# Patient Record
Sex: MALE | Race: ASIAN | NOT HISPANIC OR LATINO
[De-identification: names, ages, dates, MRNs, and addresses within clinical notes are randomized per-mention and may not be internally consistent; named-entity substitution may affect disease eponyms.]

---

## 2017-10-07 ENCOUNTER — APPOINTMENT (OUTPATIENT)
Dept: MRI IMAGING | Facility: IMAGING CENTER | Age: 62
End: 2017-10-07
Payer: COMMERCIAL

## 2017-10-07 ENCOUNTER — OUTPATIENT (OUTPATIENT)
Dept: OUTPATIENT SERVICES | Facility: HOSPITAL | Age: 62
LOS: 1 days | End: 2017-10-07
Payer: COMMERCIAL

## 2017-10-07 DIAGNOSIS — Z00.8 ENCOUNTER FOR OTHER GENERAL EXAMINATION: ICD-10-CM

## 2017-10-07 PROCEDURE — A9585: CPT

## 2017-10-07 PROCEDURE — 70553 MRI BRAIN STEM W/O & W/DYE: CPT | Mod: 26

## 2017-10-07 PROCEDURE — 82565 ASSAY OF CREATININE: CPT

## 2017-10-07 PROCEDURE — 70553 MRI BRAIN STEM W/O & W/DYE: CPT

## 2017-12-19 ENCOUNTER — APPOINTMENT (OUTPATIENT)
Dept: MRI IMAGING | Facility: HOSPITAL | Age: 62
End: 2017-12-19

## 2017-12-19 ENCOUNTER — OUTPATIENT (OUTPATIENT)
Dept: OUTPATIENT SERVICES | Facility: HOSPITAL | Age: 62
LOS: 1 days | End: 2017-12-19
Payer: COMMERCIAL

## 2017-12-19 DIAGNOSIS — D49.6 NEOPLASM OF UNSPECIFIED BEHAVIOR OF BRAIN: ICD-10-CM

## 2017-12-19 DIAGNOSIS — I67.1 CEREBRAL ANEURYSM, NONRUPTURED: ICD-10-CM

## 2017-12-19 LAB — CREAT SERPL-MCNC: 0.87 MG/DL — SIGNIFICANT CHANGE UP (ref 0.5–1.3)

## 2017-12-19 PROCEDURE — 70555 FMRI BRAIN BY PHYS/PSYCH: CPT

## 2017-12-19 PROCEDURE — 70555 FMRI BRAIN BY PHYS/PSYCH: CPT | Mod: 26

## 2017-12-19 PROCEDURE — 70553 MRI BRAIN STEM W/O & W/DYE: CPT | Mod: 26

## 2017-12-19 PROCEDURE — 82565 ASSAY OF CREATININE: CPT

## 2017-12-19 PROCEDURE — 70553 MRI BRAIN STEM W/O & W/DYE: CPT

## 2017-12-19 PROCEDURE — A9585: CPT

## 2018-01-04 ENCOUNTER — OUTPATIENT (OUTPATIENT)
Dept: OUTPATIENT SERVICES | Facility: HOSPITAL | Age: 63
LOS: 1 days | End: 2018-01-04
Payer: COMMERCIAL

## 2018-01-04 VITALS
RESPIRATION RATE: 16 BRPM | DIASTOLIC BLOOD PRESSURE: 72 MMHG | HEART RATE: 75 BPM | HEIGHT: 66.5 IN | TEMPERATURE: 97 F | SYSTOLIC BLOOD PRESSURE: 128 MMHG | OXYGEN SATURATION: 97 % | WEIGHT: 151.9 LBS

## 2018-01-04 DIAGNOSIS — D49.6 NEOPLASM OF UNSPECIFIED BEHAVIOR OF BRAIN: ICD-10-CM

## 2018-01-04 DIAGNOSIS — R56.9 UNSPECIFIED CONVULSIONS: ICD-10-CM

## 2018-01-04 DIAGNOSIS — E11.9 TYPE 2 DIABETES MELLITUS WITHOUT COMPLICATIONS: ICD-10-CM

## 2018-01-04 DIAGNOSIS — G93.9 DISORDER OF BRAIN, UNSPECIFIED: Chronic | ICD-10-CM

## 2018-01-04 DIAGNOSIS — R06.83 SNORING: ICD-10-CM

## 2018-01-04 LAB
BLD GP AB SCN SERPL QL: NEGATIVE — SIGNIFICANT CHANGE UP
BUN SERPL-MCNC: 14 MG/DL — SIGNIFICANT CHANGE UP (ref 7–23)
CALCIUM SERPL-MCNC: 9 MG/DL — SIGNIFICANT CHANGE UP (ref 8.4–10.5)
CHLORIDE SERPL-SCNC: 100 MMOL/L — SIGNIFICANT CHANGE UP (ref 98–107)
CO2 SERPL-SCNC: 27 MMOL/L — SIGNIFICANT CHANGE UP (ref 22–31)
CREAT SERPL-MCNC: 0.84 MG/DL — SIGNIFICANT CHANGE UP (ref 0.5–1.3)
GLUCOSE SERPL-MCNC: 147 MG/DL — HIGH (ref 70–99)
HBA1C BLD-MCNC: 7.3 % — HIGH (ref 4–5.6)
HCT VFR BLD CALC: 49.6 % — SIGNIFICANT CHANGE UP (ref 39–50)
HGB BLD-MCNC: 16.2 G/DL — SIGNIFICANT CHANGE UP (ref 13–17)
MCHC RBC-ENTMCNC: 32.3 PG — SIGNIFICANT CHANGE UP (ref 27–34)
MCHC RBC-ENTMCNC: 32.7 % — SIGNIFICANT CHANGE UP (ref 32–36)
MCV RBC AUTO: 98.8 FL — SIGNIFICANT CHANGE UP (ref 80–100)
NRBC # FLD: 0 — SIGNIFICANT CHANGE UP
PHENYTOIN FREE SERPL-MCNC: 12.5 UG/ML — SIGNIFICANT CHANGE UP (ref 10–20)
PLATELET # BLD AUTO: 210 K/UL — SIGNIFICANT CHANGE UP (ref 150–400)
PMV BLD: 9.4 FL — SIGNIFICANT CHANGE UP (ref 7–13)
POTASSIUM SERPL-MCNC: 3.8 MMOL/L — SIGNIFICANT CHANGE UP (ref 3.5–5.3)
POTASSIUM SERPL-SCNC: 3.8 MMOL/L — SIGNIFICANT CHANGE UP (ref 3.5–5.3)
RBC # BLD: 5.02 M/UL — SIGNIFICANT CHANGE UP (ref 4.2–5.8)
RBC # FLD: 12.2 % — SIGNIFICANT CHANGE UP (ref 10.3–14.5)
RH IG SCN BLD-IMP: POSITIVE — SIGNIFICANT CHANGE UP
SODIUM SERPL-SCNC: 141 MMOL/L — SIGNIFICANT CHANGE UP (ref 135–145)
WBC # BLD: 4.13 K/UL — SIGNIFICANT CHANGE UP (ref 3.8–10.5)
WBC # FLD AUTO: 4.13 K/UL — SIGNIFICANT CHANGE UP (ref 3.8–10.5)

## 2018-01-04 PROCEDURE — 93010 ELECTROCARDIOGRAM REPORT: CPT
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
## 2018-01-04 NOTE — H&P PST ADULT - SOURCE OF INFORMATION, PROFILE
cell [phone]; home [phone]; authorized wife, Nirmala, [phone] and son, Sung, cell [phone] to receive information/patient

## 2018-01-04 NOTE — H&P PST ADULT - FAMILY HISTORY
Mother  Still living? Yes, Estimated age: Age Unknown  Family history of diabetes mellitus in mother, Age at diagnosis: Age Unknown     Father  Still living? No  Family history of diabetes mellitus in father, Age at diagnosis: Age Unknown

## 2018-01-04 NOTE — H&P PST ADULT - HISTORY OF PRESENT ILLNESS
This is a 61 y/o male who presents with h/o brain surgery in 2009 with subsequent chemotherapy. Monitored with serial MRIs. Recent testing revealed right parietal mass. Scheduled for stereotactic right parietal awake craniotomy for brain tumor on 1-8-18

## 2018-01-04 NOTE — H&P PST ADULT - NEUROLOGICAL COMMENTS
h/o brain tumor with surgery and subsequent chemotherapy; h/o "partial" seizures left side, as recent as 3 weeks ago

## 2018-01-04 NOTE — H&P PST ADULT - PROBLEM SELECTOR PLAN 1
This is a  61 y/o male who is scheduled for stereotactic right parietal awake craniotomy for brain tumor  * Given scrub cleanser  * Instructed to take normal pm dose of dilantin the pm before surgery

## 2018-01-04 NOTE — H&P PST ADULT - ENDOCRINE COMMENTS
denies thyroid disease; h/o DM type II -- diagnosed approximately 2007 or 2008 -- recently taken off medication by his MD denies thyroid disease; h/o DM type II -- diagnosed approximately 2007 or 2008 -- recently taken off medication by his MD; doesn't do finger sticks

## 2018-01-04 NOTE — H&P PST ADULT - LYMPHATIC
supraclavicular R/posterior cervical R/anterior cervical R/supraclavicular L/anterior cervical L/posterior cervical L

## 2018-01-04 NOTE — H&P PST ADULT - PMH
Brain tumor  2009, had surgery with post op chemotherapy  Mass  right parietal in Jan. 2018  Seizure  manifested on left side -- "partial" Brain tumor  2009, had surgery with post op chemotherapy  Diabetes mellitus  type II diagnosed approx. 2007 or 2008 -- recently taken off medication Dec. 2017 to Jan. 2018 by his MD  Mass  right parietal in Jan. 2018  Seizure  manifested on left side -- "partial"  Snoring  JENNIFER precautions -- responds affirmatively to STOP BANG questionnaire -- admits to loud snoring; age > 50; gender, male

## 2018-01-04 NOTE — H&P PST ADULT - PROBLEM SELECTOR PLAN 4
Await medical evaluation by pcp due to h/o brain tumor and necessity for accurate information from pcp  * Notified OR booking via fax of JENNIFER precautions  * Patient had been taken of metformin by his pcp 1.5 weeks ago Await pre-arranged medical evaluation by pcp due to h/o brain tumor and necessity for accurate information from pcp  * Notified OR booking via fax of diabetes mellitus  * Patient had been taken of metformin by his pcp 1.5 weeks ago

## 2018-01-05 NOTE — ASU PATIENT PROFILE, ADULT - PMH
Brain tumor  2009, had surgery with post op chemotherapy  Diabetes mellitus  type II diagnosed approx. 2007 or 2008 -- recently taken off medication Dec. 2017 to Jan. 2018 by his MD  Mass  right parietal in Jan. 2018  Seizure  manifested on left side -- "partial"  Snoring  JENNIFER precautions -- responds affirmatively to STOP BANG questionnaire -- admits to loud snoring; age > 50; gender, male

## 2018-01-08 ENCOUNTER — RESULT REVIEW (OUTPATIENT)
Age: 63
End: 2018-01-08

## 2018-01-08 ENCOUNTER — TRANSCRIPTION ENCOUNTER (OUTPATIENT)
Age: 63
End: 2018-01-08

## 2018-01-08 ENCOUNTER — INPATIENT (INPATIENT)
Facility: HOSPITAL | Age: 63
LOS: 1 days | Discharge: HOME | End: 2018-01-10
Attending: NEUROLOGICAL SURGERY | Admitting: NEUROLOGICAL SURGERY
Payer: COMMERCIAL

## 2018-01-08 VITALS
SYSTOLIC BLOOD PRESSURE: 117 MMHG | OXYGEN SATURATION: 100 % | RESPIRATION RATE: 17 BRPM | HEIGHT: 66.5 IN | TEMPERATURE: 98 F | DIASTOLIC BLOOD PRESSURE: 68 MMHG | WEIGHT: 151.9 LBS | HEART RATE: 80 BPM

## 2018-01-08 DIAGNOSIS — G93.9 DISORDER OF BRAIN, UNSPECIFIED: Chronic | ICD-10-CM

## 2018-01-08 DIAGNOSIS — D49.6 NEOPLASM OF UNSPECIFIED BEHAVIOR OF BRAIN: ICD-10-CM

## 2018-01-08 LAB
ALBUMIN SERPL ELPH-MCNC: 3.5 G/DL — SIGNIFICANT CHANGE UP (ref 3.3–5)
ALP SERPL-CCNC: 79 U/L — SIGNIFICANT CHANGE UP (ref 40–120)
ALT FLD-CCNC: 10 U/L — SIGNIFICANT CHANGE UP (ref 4–41)
APTT BLD: 27.8 SEC — SIGNIFICANT CHANGE UP (ref 27.5–37.4)
AST SERPL-CCNC: 14 U/L — SIGNIFICANT CHANGE UP (ref 4–40)
BASE EXCESS BLDA CALC-SCNC: -1.7 MMOL/L — SIGNIFICANT CHANGE UP
BASE EXCESS BLDA CALC-SCNC: 1.2 MMOL/L — SIGNIFICANT CHANGE UP
BASOPHILS # BLD AUTO: 0.01 K/UL — SIGNIFICANT CHANGE UP (ref 0–0.2)
BASOPHILS NFR BLD AUTO: 0.1 % — SIGNIFICANT CHANGE UP (ref 0–2)
BILIRUB SERPL-MCNC: < 0.2 MG/DL — LOW (ref 0.2–1.2)
BUN SERPL-MCNC: 12 MG/DL — SIGNIFICANT CHANGE UP (ref 7–23)
CA-I BLDA-SCNC: 1.18 MMOL/L — SIGNIFICANT CHANGE UP (ref 1.15–1.29)
CA-I BLDA-SCNC: 1.21 MMOL/L — SIGNIFICANT CHANGE UP (ref 1.15–1.29)
CALCIUM SERPL-MCNC: 8 MG/DL — LOW (ref 8.4–10.5)
CHLORIDE SERPL-SCNC: 106 MMOL/L — SIGNIFICANT CHANGE UP (ref 98–107)
CO2 SERPL-SCNC: 23 MMOL/L — SIGNIFICANT CHANGE UP (ref 22–31)
CREAT SERPL-MCNC: 0.76 MG/DL — SIGNIFICANT CHANGE UP (ref 0.5–1.3)
EOSINOPHIL # BLD AUTO: 0 K/UL — SIGNIFICANT CHANGE UP (ref 0–0.5)
EOSINOPHIL NFR BLD AUTO: 0 % — SIGNIFICANT CHANGE UP (ref 0–6)
GLUCOSE BLDA-MCNC: 199 MG/DL — HIGH (ref 70–99)
GLUCOSE BLDA-MCNC: 217 MG/DL — HIGH (ref 70–99)
GLUCOSE BLDC GLUCOMTR-MCNC: 132 MG/DL — HIGH (ref 70–99)
GLUCOSE BLDC GLUCOMTR-MCNC: 158 MG/DL — HIGH (ref 70–99)
GLUCOSE BLDC GLUCOMTR-MCNC: 188 MG/DL — HIGH (ref 70–99)
GLUCOSE SERPL-MCNC: 209 MG/DL — HIGH (ref 70–99)
HCO3 BLDA-SCNC: 22 MMOL/L — SIGNIFICANT CHANGE UP (ref 22–26)
HCO3 BLDA-SCNC: 25 MMOL/L — SIGNIFICANT CHANGE UP (ref 22–26)
HCT VFR BLD CALC: 40.8 % — SIGNIFICANT CHANGE UP (ref 39–50)
HCT VFR BLDA CALC: 43.6 % — SIGNIFICANT CHANGE UP (ref 39–51)
HCT VFR BLDA CALC: 45.5 % — SIGNIFICANT CHANGE UP (ref 39–51)
HGB BLD-MCNC: 13.5 G/DL — SIGNIFICANT CHANGE UP (ref 13–17)
HGB BLDA-MCNC: 14.2 G/DL — SIGNIFICANT CHANGE UP (ref 13–17)
HGB BLDA-MCNC: 14.8 G/DL — SIGNIFICANT CHANGE UP (ref 13–17)
IMM GRANULOCYTES # BLD AUTO: 0.02 # — SIGNIFICANT CHANGE UP
IMM GRANULOCYTES NFR BLD AUTO: 0.2 % — SIGNIFICANT CHANGE UP (ref 0–1.5)
INR BLD: 1.09 — SIGNIFICANT CHANGE UP (ref 0.88–1.17)
LYMPHOCYTES # BLD AUTO: 0.35 K/UL — LOW (ref 1–3.3)
LYMPHOCYTES # BLD AUTO: 4 % — LOW (ref 13–44)
MCHC RBC-ENTMCNC: 32.6 PG — SIGNIFICANT CHANGE UP (ref 27–34)
MCHC RBC-ENTMCNC: 33.1 % — SIGNIFICANT CHANGE UP (ref 32–36)
MCV RBC AUTO: 98.6 FL — SIGNIFICANT CHANGE UP (ref 80–100)
MONOCYTES # BLD AUTO: 0.11 K/UL — SIGNIFICANT CHANGE UP (ref 0–0.9)
MONOCYTES NFR BLD AUTO: 1.2 % — LOW (ref 2–14)
NEUTROPHILS # BLD AUTO: 8.34 K/UL — HIGH (ref 1.8–7.4)
NEUTROPHILS NFR BLD AUTO: 94.5 % — HIGH (ref 43–77)
NRBC # FLD: 0 — SIGNIFICANT CHANGE UP
PCO2 BLDA: 47 MMHG — SIGNIFICANT CHANGE UP (ref 35–48)
PCO2 BLDA: 51 MMHG — HIGH (ref 35–48)
PH BLDA: 7.29 PH — LOW (ref 7.35–7.45)
PH BLDA: 7.36 PH — SIGNIFICANT CHANGE UP (ref 7.35–7.45)
PLATELET # BLD AUTO: 162 K/UL — SIGNIFICANT CHANGE UP (ref 150–400)
PMV BLD: 8.9 FL — SIGNIFICANT CHANGE UP (ref 7–13)
PO2 BLDA: 90 MMHG — SIGNIFICANT CHANGE UP (ref 83–108)
PO2 BLDA: 99 MMHG — SIGNIFICANT CHANGE UP (ref 83–108)
POTASSIUM BLDA-SCNC: 3.6 MMOL/L — SIGNIFICANT CHANGE UP (ref 3.4–4.5)
POTASSIUM BLDA-SCNC: 4 MMOL/L — SIGNIFICANT CHANGE UP (ref 3.4–4.5)
POTASSIUM SERPL-MCNC: 4.3 MMOL/L — SIGNIFICANT CHANGE UP (ref 3.5–5.3)
POTASSIUM SERPL-SCNC: 4.3 MMOL/L — SIGNIFICANT CHANGE UP (ref 3.5–5.3)
PROT SERPL-MCNC: 5.9 G/DL — LOW (ref 6–8.3)
PROTHROM AB SERPL-ACNC: 12.6 SEC — SIGNIFICANT CHANGE UP (ref 9.8–13.1)
RBC # BLD: 4.14 M/UL — LOW (ref 4.2–5.8)
RBC # FLD: 12.1 % — SIGNIFICANT CHANGE UP (ref 10.3–14.5)
REVIEW TO FOLLOW: YES — SIGNIFICANT CHANGE UP
SAO2 % BLDA: 95.7 % — SIGNIFICANT CHANGE UP (ref 95–99)
SAO2 % BLDA: 96.8 % — SIGNIFICANT CHANGE UP (ref 95–99)
SODIUM BLDA-SCNC: 139 MMOL/L — SIGNIFICANT CHANGE UP (ref 136–146)
SODIUM BLDA-SCNC: 139 MMOL/L — SIGNIFICANT CHANGE UP (ref 136–146)
SODIUM SERPL-SCNC: 144 MMOL/L — SIGNIFICANT CHANGE UP (ref 135–145)
WBC # BLD: 8.83 K/UL — SIGNIFICANT CHANGE UP (ref 3.8–10.5)
WBC # FLD AUTO: 8.83 K/UL — SIGNIFICANT CHANGE UP (ref 3.8–10.5)

## 2018-01-08 PROCEDURE — 88341 IMHCHEM/IMCYTCHM EA ADD ANTB: CPT | Mod: 26,59

## 2018-01-08 PROCEDURE — 88307 TISSUE EXAM BY PATHOLOGIST: CPT | Mod: 26

## 2018-01-08 PROCEDURE — 88331 PATH CONSLTJ SURG 1 BLK 1SPC: CPT | Mod: 26

## 2018-01-08 PROCEDURE — 88360 TUMOR IMMUNOHISTOCHEM/MANUAL: CPT | Mod: 26

## 2018-01-08 PROCEDURE — 88342 IMHCHEM/IMCYTCHM 1ST ANTB: CPT | Mod: 26,59

## 2018-01-08 PROCEDURE — 88334 PATH CONSLTJ SURG CYTO XM EA: CPT | Mod: 26,59

## 2018-01-08 RX ORDER — DEXTROSE 50 % IN WATER 50 %
25 SYRINGE (ML) INTRAVENOUS ONCE
Qty: 0 | Refills: 0 | Status: DISCONTINUED | OUTPATIENT
Start: 2018-01-08 | End: 2018-01-10

## 2018-01-08 RX ORDER — ACETAMINOPHEN 500 MG
650 TABLET ORAL EVERY 6 HOURS
Qty: 0 | Refills: 0 | Status: DISCONTINUED | OUTPATIENT
Start: 2018-01-08 | End: 2018-01-08

## 2018-01-08 RX ORDER — ACETAMINOPHEN 500 MG
650 TABLET ORAL EVERY 6 HOURS
Qty: 0 | Refills: 0 | Status: DISCONTINUED | OUTPATIENT
Start: 2018-01-08 | End: 2018-01-10

## 2018-01-08 RX ORDER — FENTANYL CITRATE 50 UG/ML
25 INJECTION INTRAVENOUS
Qty: 0 | Refills: 0 | Status: DISCONTINUED | OUTPATIENT
Start: 2018-01-08 | End: 2018-01-08

## 2018-01-08 RX ORDER — DEXTROSE 50 % IN WATER 50 %
12.5 SYRINGE (ML) INTRAVENOUS ONCE
Qty: 0 | Refills: 0 | Status: DISCONTINUED | OUTPATIENT
Start: 2018-01-08 | End: 2018-01-10

## 2018-01-08 RX ORDER — OXYCODONE HYDROCHLORIDE 5 MG/1
10 TABLET ORAL EVERY 6 HOURS
Qty: 0 | Refills: 0 | Status: DISCONTINUED | OUTPATIENT
Start: 2018-01-08 | End: 2018-01-08

## 2018-01-08 RX ORDER — OXYCODONE HYDROCHLORIDE 5 MG/1
5 TABLET ORAL EVERY 4 HOURS
Qty: 0 | Refills: 0 | Status: DISCONTINUED | OUTPATIENT
Start: 2018-01-08 | End: 2018-01-08

## 2018-01-08 RX ORDER — DOCUSATE SODIUM 100 MG
100 CAPSULE ORAL THREE TIMES A DAY
Qty: 0 | Refills: 0 | Status: DISCONTINUED | OUTPATIENT
Start: 2018-01-08 | End: 2018-01-10

## 2018-01-08 RX ORDER — GLUCAGON INJECTION, SOLUTION 0.5 MG/.1ML
1 INJECTION, SOLUTION SUBCUTANEOUS ONCE
Qty: 0 | Refills: 0 | Status: DISCONTINUED | OUTPATIENT
Start: 2018-01-08 | End: 2018-01-10

## 2018-01-08 RX ORDER — DEXAMETHASONE 0.5 MG/5ML
4 ELIXIR ORAL EVERY 6 HOURS
Qty: 0 | Refills: 0 | Status: DISCONTINUED | OUTPATIENT
Start: 2018-01-08 | End: 2018-01-10

## 2018-01-08 RX ORDER — DEXTROSE 50 % IN WATER 50 %
1 SYRINGE (ML) INTRAVENOUS ONCE
Qty: 0 | Refills: 0 | Status: DISCONTINUED | OUTPATIENT
Start: 2018-01-08 | End: 2018-01-10

## 2018-01-08 RX ORDER — METOCLOPRAMIDE HCL 10 MG
10 TABLET ORAL ONCE
Qty: 0 | Refills: 0 | Status: COMPLETED | OUTPATIENT
Start: 2018-01-08 | End: 2018-01-08

## 2018-01-08 RX ORDER — OXYCODONE AND ACETAMINOPHEN 5; 325 MG/1; MG/1
1 TABLET ORAL EVERY 4 HOURS
Qty: 0 | Refills: 0 | Status: DISCONTINUED | OUTPATIENT
Start: 2018-01-08 | End: 2018-01-10

## 2018-01-08 RX ORDER — INSULIN LISPRO 100/ML
VIAL (ML) SUBCUTANEOUS AT BEDTIME
Qty: 0 | Refills: 0 | Status: DISCONTINUED | OUTPATIENT
Start: 2018-01-08 | End: 2018-01-10

## 2018-01-08 RX ORDER — INSULIN LISPRO 100/ML
VIAL (ML) SUBCUTANEOUS
Qty: 0 | Refills: 0 | Status: DISCONTINUED | OUTPATIENT
Start: 2018-01-08 | End: 2018-01-10

## 2018-01-08 RX ORDER — CEFAZOLIN SODIUM 1 G
2000 VIAL (EA) INJECTION EVERY 8 HOURS
Qty: 0 | Refills: 0 | Status: COMPLETED | OUTPATIENT
Start: 2018-01-08 | End: 2018-01-09

## 2018-01-08 RX ORDER — HYDROMORPHONE HYDROCHLORIDE 2 MG/ML
1 INJECTION INTRAMUSCULAR; INTRAVENOUS; SUBCUTANEOUS
Qty: 0 | Refills: 0 | Status: DISCONTINUED | OUTPATIENT
Start: 2018-01-08 | End: 2018-01-08

## 2018-01-08 RX ORDER — SODIUM CHLORIDE 9 MG/ML
1000 INJECTION INTRAMUSCULAR; INTRAVENOUS; SUBCUTANEOUS
Qty: 0 | Refills: 0 | Status: DISCONTINUED | OUTPATIENT
Start: 2018-01-08 | End: 2018-01-09

## 2018-01-08 RX ORDER — OXYCODONE AND ACETAMINOPHEN 5; 325 MG/1; MG/1
1 TABLET ORAL EVERY 4 HOURS
Qty: 0 | Refills: 0 | Status: DISCONTINUED | OUTPATIENT
Start: 2018-01-08 | End: 2018-01-08

## 2018-01-08 RX ORDER — SODIUM CHLORIDE 9 MG/ML
1000 INJECTION, SOLUTION INTRAVENOUS
Qty: 0 | Refills: 0 | Status: DISCONTINUED | OUTPATIENT
Start: 2018-01-08 | End: 2018-01-10

## 2018-01-08 RX ADMIN — SODIUM CHLORIDE 75 MILLILITER(S): 9 INJECTION INTRAMUSCULAR; INTRAVENOUS; SUBCUTANEOUS at 14:30

## 2018-01-08 RX ADMIN — Medication 100 MILLIGRAM(S): at 23:14

## 2018-01-08 RX ADMIN — Medication 100 MILLIGRAM(S): at 21:33

## 2018-01-08 RX ADMIN — Medication 4 MILLIGRAM(S): at 23:14

## 2018-01-08 RX ADMIN — Medication 100 MILLIGRAM(S): at 16:04

## 2018-01-08 RX ADMIN — Medication 1: at 15:57

## 2018-01-08 RX ADMIN — Medication 10 MILLIGRAM(S): at 21:33

## 2018-01-08 RX ADMIN — Medication 4 MILLIGRAM(S): at 18:31

## 2018-01-08 RX ADMIN — Medication 400 MILLIGRAM(S): at 23:15

## 2018-01-08 NOTE — PROGRESS NOTE ADULT - PROBLEM SELECTOR PLAN 1
Admit to SICU for Q1 neuro checks  Brain MRI in AM  Ancef and keppra post op  Short course of steroids  Pain Management Admit to SICU for Q1 neuro checks  Brain MRI in AM  Ancef and keppra post op  Pain Management

## 2018-01-08 NOTE — CONSULT NOTE ADULT - SUBJECTIVE AND OBJECTIVE BOX
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 Since then he had been on Dilantin for seizures but states that he rarely gets them. His last seizure was three weeks ago.   He is now s/p right craniotomy for resection of oligodendroglioma. EBL was 100 cc's. He received 1 L crystalloid. UOP was 500 cc's.    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    CODE STATUS: Full Code    HOME MEDICATIONS:    ALLERGIES: No Known Allergies      VITAL SIGNS:  ICU Vital Signs Last 24 Hrs  T(C): 36.9 (08 Jan 2018 13:50), Max: 36.9 (08 Jan 2018 13:50)  T(F): 98.4 (08 Jan 2018 13:50), Max: 98.4 (08 Jan 2018 13:50)  HR: 74 (08 Jan 2018 14:05) (71 - 80)  BP: 109/65 (08 Jan 2018 14:05) (100/68 - 117/68)  BP(mean): 75 (08 Jan 2018 14:05) (74 - 78)  ABP: --  ABP(mean): --  RR: 13 (08 Jan 2018 14:05) (13 - 17)  SpO2: 99% (08 Jan 2018 14:05) (98% - 100%)      NEURO  Exam: alert and oriented x 4; cranial nerves intact 5/5 strength RUE; 3/5 strength LUE, 5/5 strength b/l LE  Meds:acetaminophen   Tablet 650 milliGRAM(s) Oral every 6 hours PRN For Temp greater than 38 C (100.4 F)  acetaminophen   Tablet. 650 milliGRAM(s) Oral every 6 hours PRN Mild Pain (1 - 3)  fentaNYL    Injectable 25 MICROGram(s) IV Push every 5 minutes PRN Mild Pain (1 - 3)  HYDROmorphone  Injectable 1 milliGRAM(s) IV Push every 10 minutes PRN Moderate Pain (4 - 6)  oxyCODONE    5 mG/acetaminophen 325 mG 1 Tablet(s) Oral every 4 hours PRN Moderate Pain  phenytoin   Capsule 400 milliGRAM(s) Oral at bedtime      RESPIRATORY  Mechanical Ventilation:   ABG - ( 08 Jan 2018 12:39 )  pH: 7.29  /  pCO2: 51    /  pO2: 90    / HCO3: 22    / Base Excess: -1.7  /  SaO2: 95.7    Lactate: x                Exam: clear to auscultation bilaterally  Meds: none    CARDIOVASCULAR    Exam: regular rate and rhythm   Cardiac Rhythm: sinus  Meds: none    GI/NUTRITION  Exam: soft, nontender, nondistended  Diet: Regular, consistent carb  Meds:docusate sodium 100 milliGRAM(s) Oral three times a day      GENITOURINARY/RENAL  Meds:dextrose 5%. 1000 milliLiter(s) IV Continuous <Continuous>  sodium chloride 0.9%. 1000 milliLiter(s) IV Continuous <Continuous>      Weight (kg): 68.9 (01-08 @ 06:14)        [x] Robles catheter, indication: urine output monitoring in critically ill patient    HEMATOLOGIC  [ ] VTE Prophylaxis: N/A        Transfusion: [ ] PRBC	[ ] Platelets	[ ] FFP	[ ] Cryoprecipitate - N/A      INFECTIOUS DISEASES  Meds:ceFAZolin   IVPB 2000 milliGRAM(s) IV Intermittent every 8 hours    RECENT CULTURES:      ENDOCRINE  Meds:dexamethasone     Tablet 4 milliGRAM(s) Oral every 6 hours  dextrose 50% Injectable 12.5 Gram(s) IV Push once  dextrose 50% Injectable 25 Gram(s) IV Push once  dextrose 50% Injectable 25 Gram(s) IV Push once  dextrose Gel 1 Dose(s) Oral once PRN  glucagon  Injectable 1 milliGRAM(s) IntraMuscular once PRN  insulin lispro (HumaLOG) corrective regimen sliding scale   SubCutaneous three times a day before meals  insulin lispro (HumaLOG) corrective regimen sliding scale   SubCutaneous at bedtime    CAPILLARY BLOOD GLUCOSE      POCT Blood Glucose.: 158 mg/dL (08 Jan 2018 06:47)      PATIENT CARE ACCESS DEVICES:  [ x] Peripheral IV  [ ] Central Venous Line	[ ] R	[ ] L	[ ] IJ	[ ] Fem	[ ] SC	Placed:   [x ] Arterial Line		[ x] R	[ ] L	[ ] Fem	[ x] Rad	[ ] Ax	Placed:   [ ] PICC:					[ ] Mediport  [x ] Urinary Catheter, Date Placed:   [x] Necessity of urinary, arterial, and venous catheters discussed    OTHER MEDICATIONS:     IMAGING STUDIES:

## 2018-01-08 NOTE — CONSULT NOTE ADULT - ASSESSMENT
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 monitor electrolytes on BMP  Endocrine: ISS, decadron  ID: ancef prophylaxis  Heme: Hold DVT ppx until am CT    Dispo: [facility]    - ANTONETTE Aguilar, PGY2  x [phone]

## 2018-01-08 NOTE — PROGRESS NOTE ADULT - SUBJECTIVE AND OBJECTIVE BOX
NEUROSURGERY    Patient is a 62y old  Male who presents with a chief complaint of they are operating on the right brain (04 Jan 2018 08:14)for recurrent brain tumor    HPI:  This is a 63 y/o male who presents with h/o brain surgery in 2009 with subsequent chemotherapy. Monitored with serial MRIs. Recent testing revealed right parietal mass. Scheduled for stereotactic right parietal awake craniotomy for brain tumor on 1-8-18 (04 Jan 2018 08:14)    Post op Note    ICU Vital Signs Last 24 Hrs  T(C): 36.8 (08 Jan 2018 20:00), Max: 37 (08 Jan 2018 15:00)  T(F): 98.3 (08 Jan 2018 20:00), Max: 98.6 (08 Jan 2018 15:00)  HR: 85 (08 Jan 2018 21:00) (71 - 85)  BP: 117/75 (08 Jan 2018 16:00) (100/68 - 122/70)  BP(mean): 85 (08 Jan 2018 16:00) (74 - 85)  ABP: 120/75 (08 Jan 2018 21:00) (112/61 - 144/80)  ABP(mean): 94 (08 Jan 2018 21:00) (80 - 103)  RR: 16 (08 Jan 2018 21:00) (13 - 22)  SpO2: 98% (08 Jan 2018 21:00) (95% - 100%)    Exam    Awake, alert, responsive, conversant  C/o mild intermittent nausea  Pupils = R, EOM intact  FC+ on all 4 ext, HOBBS with good strength  Wound dressing dry and intact                          13.5   8.83  )-----------( 162      ( 08 Jan 2018 14:38 )             40.8     08 Jan 2018 14:38    144    |  106    |  12     ----------------------------<  209    4.3     |  23     |  0.76     Ca    8.0        08 Jan 2018 14:38    TPro  5.9    /  Alb  3.5    /  TBili  < 0.2  /  DBili  x      /  AST  14     /  ALT  10     /  AlkPhos  79     08 Jan 2018 14:38    LIVER FUNCTIONS - ( 08 Jan 2018 14:38 )  Alb: 3.5 g/dL / Pro: 5.9 g/dL / ALK PHOS: 79 u/L / ALT: 10 u/L / AST: 14 u/L / GGT: x           PT/INR - ( 08 Jan 2018 14:38 )   PT: 12.6 SEC;   INR: 1.09       PTT - ( 08 Jan 2018 14:38 )  PTT:27.8 SEC  CAPILLARY BLOOD GLUCOSE  POCT Blood Glucose.: 188 mg/dL (08 Jan 2018 15:54)  POCT Blood Glucose.: 158 mg/dL (08 Jan 2018 06:47)    Radiology    Post op MRI in AM

## 2018-01-09 LAB
BASOPHILS NFR SPEC: 0 % — SIGNIFICANT CHANGE UP (ref 0–2)
BLASTS # FLD: 0 % — SIGNIFICANT CHANGE UP (ref 0–0)
BUN SERPL-MCNC: 8 MG/DL — SIGNIFICANT CHANGE UP (ref 7–23)
BURR CELLS BLD QL SMEAR: SLIGHT — SIGNIFICANT CHANGE UP
CALCIUM SERPL-MCNC: 7.7 MG/DL — LOW (ref 8.4–10.5)
CHLORIDE SERPL-SCNC: 102 MMOL/L — SIGNIFICANT CHANGE UP (ref 98–107)
CO2 SERPL-SCNC: 24 MMOL/L — SIGNIFICANT CHANGE UP (ref 22–31)
CREAT SERPL-MCNC: 0.72 MG/DL — SIGNIFICANT CHANGE UP (ref 0.5–1.3)
EOSINOPHIL NFR FLD: 0 % — SIGNIFICANT CHANGE UP (ref 0–6)
GLUCOSE BLDC GLUCOMTR-MCNC: 133 MG/DL — HIGH (ref 70–99)
GLUCOSE BLDC GLUCOMTR-MCNC: 141 MG/DL — HIGH (ref 70–99)
GLUCOSE BLDC GLUCOMTR-MCNC: 150 MG/DL — HIGH (ref 70–99)
GLUCOSE BLDC GLUCOMTR-MCNC: 171 MG/DL — HIGH (ref 70–99)
GLUCOSE SERPL-MCNC: 158 MG/DL — HIGH (ref 70–99)
HCT VFR BLD CALC: 39.8 % — SIGNIFICANT CHANGE UP (ref 39–50)
HGB BLD-MCNC: 12.9 G/DL — LOW (ref 13–17)
LYMPHOCYTES NFR SPEC AUTO: 1.7 % — LOW (ref 13–44)
MAGNESIUM SERPL-MCNC: 1.6 MG/DL — SIGNIFICANT CHANGE UP (ref 1.6–2.6)
MCHC RBC-ENTMCNC: 32.3 PG — SIGNIFICANT CHANGE UP (ref 27–34)
MCHC RBC-ENTMCNC: 32.4 % — SIGNIFICANT CHANGE UP (ref 32–36)
MCV RBC AUTO: 99.7 FL — SIGNIFICANT CHANGE UP (ref 80–100)
METAMYELOCYTES # FLD: 0 % — SIGNIFICANT CHANGE UP (ref 0–1)
MONOCYTES NFR BLD: 1.7 % — LOW (ref 2–9)
MYELOCYTES NFR BLD: 0 % — SIGNIFICANT CHANGE UP (ref 0–0)
NEUTROPHIL AB SER-ACNC: 95.7 % — HIGH (ref 43–77)
NEUTS BAND # BLD: 0 % — SIGNIFICANT CHANGE UP (ref 0–6)
NRBC # FLD: 0 — SIGNIFICANT CHANGE UP
OTHER - HEMATOLOGY %: 0 — SIGNIFICANT CHANGE UP
PHOSPHATE SERPL-MCNC: 3.3 MG/DL — SIGNIFICANT CHANGE UP (ref 2.5–4.5)
PLATELET # BLD AUTO: 148 K/UL — LOW (ref 150–400)
PLATELET COUNT - ESTIMATE: NORMAL — SIGNIFICANT CHANGE UP
PMV BLD: 9.1 FL — SIGNIFICANT CHANGE UP (ref 7–13)
POIKILOCYTOSIS BLD QL AUTO: SLIGHT — SIGNIFICANT CHANGE UP
POTASSIUM SERPL-MCNC: 3.7 MMOL/L — SIGNIFICANT CHANGE UP (ref 3.5–5.3)
POTASSIUM SERPL-SCNC: 3.7 MMOL/L — SIGNIFICANT CHANGE UP (ref 3.5–5.3)
PROMYELOCYTES # FLD: 0 % — SIGNIFICANT CHANGE UP (ref 0–0)
RBC # BLD: 3.99 M/UL — LOW (ref 4.2–5.8)
RBC # FLD: 11.9 % — SIGNIFICANT CHANGE UP (ref 10.3–14.5)
SODIUM SERPL-SCNC: 140 MMOL/L — SIGNIFICANT CHANGE UP (ref 135–145)
VARIANT LYMPHS # BLD: 0.9 % — SIGNIFICANT CHANGE UP
WBC # BLD: 9.89 K/UL — SIGNIFICANT CHANGE UP (ref 3.8–10.5)
WBC # FLD AUTO: 9.89 K/UL — SIGNIFICANT CHANGE UP (ref 3.8–10.5)

## 2018-01-09 PROCEDURE — 70553 MRI BRAIN STEM W/O & W/DYE: CPT | Mod: 26

## 2018-01-09 PROCEDURE — 70450 CT HEAD/BRAIN W/O DYE: CPT | Mod: 26,GC

## 2018-01-09 PROCEDURE — 99233 SBSQ HOSP IP/OBS HIGH 50: CPT

## 2018-01-09 RX ORDER — INFLUENZA VIRUS VACCINE 15; 15; 15; 15 UG/.5ML; UG/.5ML; UG/.5ML; UG/.5ML
0.5 SUSPENSION INTRAMUSCULAR ONCE
Qty: 0 | Refills: 0 | Status: DISCONTINUED | OUTPATIENT
Start: 2018-01-09 | End: 2018-01-10

## 2018-01-09 RX ORDER — CALCIUM GLUCONATE 100 MG/ML
1 VIAL (ML) INTRAVENOUS ONCE
Qty: 0 | Refills: 0 | Status: COMPLETED | OUTPATIENT
Start: 2018-01-09 | End: 2018-01-09

## 2018-01-09 RX ADMIN — Medication 4 MILLIGRAM(S): at 11:30

## 2018-01-09 RX ADMIN — SODIUM CHLORIDE 30 MILLILITER(S): 9 INJECTION INTRAMUSCULAR; INTRAVENOUS; SUBCUTANEOUS at 09:08

## 2018-01-09 RX ADMIN — Medication 400 MILLIGRAM(S): at 22:27

## 2018-01-09 RX ADMIN — Medication 650 MILLIGRAM(S): at 16:18

## 2018-01-09 RX ADMIN — Medication 100 MILLIGRAM(S): at 05:35

## 2018-01-09 RX ADMIN — Medication 100 MILLIGRAM(S): at 18:08

## 2018-01-09 RX ADMIN — Medication 100 MILLIGRAM(S): at 09:08

## 2018-01-09 RX ADMIN — Medication 4 MILLIGRAM(S): at 05:34

## 2018-01-09 RX ADMIN — Medication 1: at 09:08

## 2018-01-09 RX ADMIN — Medication 4 MILLIGRAM(S): at 18:08

## 2018-01-09 RX ADMIN — Medication 200 GRAM(S): at 06:20

## 2018-01-09 RX ADMIN — Medication 650 MILLIGRAM(S): at 16:45

## 2018-01-09 NOTE — PHYSICAL THERAPY INITIAL EVALUATION ADULT - PERTINENT HX OF CURRENT PROBLEM, REHAB EVAL
61 y/o male who presents with h/o brain surgery in 2009 with subsequent chemotherapy. Monitored with serial MRIs. Recent testing revealed right parietal mass. Patient s/p stereotactic right parietal awake craniotomy for brain tumor on 1-8-18

## 2018-01-09 NOTE — PHYSICAL THERAPY INITIAL EVALUATION ADULT - GENERAL OBSERVATIONS, REHAB EVAL
Patient found sitting in chair in NAD; surgical incision with dressing at right parietal area CDI; family at bedside; decreased coordination noted t/o left UE.

## 2018-01-09 NOTE — PHYSICAL THERAPY INITIAL EVALUATION ADULT - ADDITIONAL COMMENTS
4 steps to enter the home with bilateral railings; flight of stairs with unilateral railing 4 steps to enter the home with bilateral railings; flight of stairs with unilateral railing; Patient reports son is a PT and lives with patient.

## 2018-01-09 NOTE — PROGRESS NOTE ADULT - SUBJECTIVE AND OBJECTIVE BOX
NEUROSURGERY    Overnight Events    ICU Vital Signs Last 24 Hrs  T(C): 37.2 (09 Jan 2018 20:00), Max: 37.7 (09 Jan 2018 16:00)  T(F): 99 (09 Jan 2018 20:00), Max: 99.9 (09 Jan 2018 16:00)  HR: 81 (09 Jan 2018 21:00) (78 - 99)  BP: 124/77 (09 Jan 2018 21:00) (97/40 - 124/77)  BP(mean): 88 (09 Jan 2018 21:00) (52 - 88)  ABP: 103/73 (09 Jan 2018 09:00) (90/64 - 118/75)  ABP(mean): 87 (09 Jan 2018 09:00) (74 - 91)  RR: 19 (09 Jan 2018 21:00) (13 - 24)  SpO2: 97% (09 Jan 2018 21:00) (96% - 100%)    Exam    Awake , alert , responsive conversant  pupils = R, EOM intact  FC+ on all 4 ext, HOBBS  Wound dressing dry and intact    Radiology    < from: MR Head w/wo IV Cont (01.09.18 @ 12:47) >  New postop changes are identified compatible with a high right frontal   parietal craniotomy near the right vertex. Subtle T1 shortening is   identified involving the peripheral aspect of the postop bed which is   likely compatible areas of hemorrhage and or postop material. There is   also subtle enhancement seen around the postop bed. This finding is   likely related to postop changes though clinical correlation and   continued close and follow-up is recommended. Small amount of air is   identified postop bed. Abnormal T2 prolongation around the postop region   is identified as well. While this could be related to postop changes, the   possibility of residual tumor cannot be entirely excluded. Clinical   correlation and continued close interval follow-up is recommended. There   is localized mass effect consisting sulcal effacement. There is   restricted diffusion seen around postop bedwhich could be compatible   with postop changes though underlying acute infarct cannot because   excluded as well.    Right frontal extra-axial air is seen.    No significant shift or herniation is seen.    The large vessels demonstrate normal flow voids.    Extracalvarial soft tissue swelling staples are identified postop region.    The visualized paranasal sinuses mastoid and middle ear regions appear   clear.    Impression: Postop changes as described above.        < end of copied text > NEUROSURGERY    Overnight Events    ICU Vital Signs Last 24 Hrs  T(C): 37.2 (09 Jan 2018 20:00), Max: 37.7 (09 Jan 2018 16:00)  T(F): 99 (09 Jan 2018 20:00), Max: 99.9 (09 Jan 2018 16:00)  HR: 81 (09 Jan 2018 21:00) (78 - 99)  BP: 124/77 (09 Jan 2018 21:00) (97/40 - 124/77)  BP(mean): 88 (09 Jan 2018 21:00) (52 - 88)  ABP: 103/73 (09 Jan 2018 09:00) (90/64 - 118/75)  ABP(mean): 87 (09 Jan 2018 09:00) (74 - 91)  RR: 19 (09 Jan 2018 21:00) (13 - 24)  SpO2: 97% (09 Jan 2018 21:00) (96% - 100%)    Exam    Awake , alert , responsive conversant  pupils = R, EOM intact  FC+ on all 4 ext, HOBBS with mild LUE weakness  Wound dressing dry and intact    Radiology    < from: MR Head w/wo IV Cont (01.09.18 @ 12:47) >  New postop changes are identified compatible with a high right frontal   parietal craniotomy near the right vertex. Subtle T1 shortening is   identified involving the peripheral aspect of the postop bed which is   likely compatible areas of hemorrhage and or postop material. There is   also subtle enhancement seen around the postop bed. This finding is   likely related to postop changes though clinical correlation and   continued close and follow-up is recommended. Small amount of air is   identified postop bed. Abnormal T2 prolongation around the postop region   is identified as well. While this could be related to postop changes, the   possibility of residual tumor cannot be entirely excluded. Clinical   correlation and continued close interval follow-up is recommended. There   is localized mass effect consisting sulcal effacement. There is   restricted diffusion seen around postop bedwhich could be compatible   with postop changes though underlying acute infarct cannot because   excluded as well.    Right frontal extra-axial air is seen.    No significant shift or herniation is seen.    The large vessels demonstrate normal flow voids.    Extracalvarial soft tissue swelling staples are identified postop region.    The visualized paranasal sinuses mastoid and middle ear regions appear   clear.    Impression: Postop changes as described above.        < end of copied text >

## 2018-01-09 NOTE — PHYSICAL THERAPY INITIAL EVALUATION ADULT - PLANNED THERAPY INTERVENTIONS, PT EVAL
strengthening/gait training/stair climbing; Patient left sitting in chair in NAD; call bell in reach; family at bedside./transfer training/bed mobility training/ROM

## 2018-01-09 NOTE — PROGRESS NOTE ADULT - ASSESSMENT
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 monitor electrolytes on BMP  Endocrine: ISS, decadron  ID: ancef prophylaxis  Heme: Hold DVT ppx until am CT    Dispo: SICU 62 year old man s/p right craniotomy with resection of recurrent oligodendroglioma    Plan:  Neuro: q1h neuro checks. Continue dilantin for seizure ppx. Decadron to reduce swelling. MRI of head w/without contrast today - F/U results.  CV: HR and BP stable - continue to monitor. No active issues.  Resp: Incentive spirometry. Satting well on room air. No active issues.  GI: Advanced to consistent carb diet  : Robles removed. Voiding spontaneously. Monitor UOP. Replete electrolytes on BMP  Endocrine: On ISS with good control. Monitor FS. Continue with decadron.  ID: Continue with Ancef prophylaxis  Heme: Hold DVT ppx until am CT    Dispo: SICU

## 2018-01-09 NOTE — PROGRESS NOTE ADULT - SUBJECTIVE AND OBJECTIVE BOX
POST ANESTHESIA EVALUATION    62y Male POSTOP DAY 1 S/P     MENTAL STATUS: Patient participation [ x ] Awake     [  ] Arousable     [  ] Sedated    AIRWAY PATENCY: [ x ] Satisfactory  [  ] Other:     Vital Signs Last 24 Hrs  T(C): 36.9 (09 Jan 2018 08:00), Max: 37.4 (09 Jan 2018 04:00)  T(F): 98.4 (09 Jan 2018 08:00), Max: 99.3 (09 Jan 2018 04:00)  HR: 92 (09 Jan 2018 10:25) (71 - 98)  BP: 104/66 (09 Jan 2018 10:25) (97/40 - 122/70)  BP(mean): 76 (09 Jan 2018 10:25) (52 - 85)  RR: 18 (09 Jan 2018 10:25) (13 - 23)  SpO2: 100% (09 Jan 2018 10:25) (95% - 100%)  I&O's Summary    08 Jan 2018 07:01  -  09 Jan 2018 07:00  --------------------------------------------------------  IN: 1550 mL / OUT: 1185 mL / NET: 365 mL    09 Jan 2018 07:01  -  09 Jan 2018 11:02  --------------------------------------------------------  IN: 60 mL / OUT: 400 mL / NET: -340 mL          NAUSEA/ VOMITTING:  [ x ] NONE  [  ] CONTROLLED [  ] OTHER     PAIN: [x  ] CONTROLLED WITH CURRENT REGIMEN  [  ] OTHER    [ x ] NO APPARENT ANESTHESIA COMPLICATIONS      Comments:

## 2018-01-09 NOTE — PATIENT PROFILE ADULT. - NS PRO ABUSE SCREEN SUSPICION NEGLECT YN
"Chief Complaint   Patient presents with   • Pharyngitis     x 3 days, x 1 week cough          Sinusitis  This is a new problem. The current episode started in the past 7 days. The problem has been gradually worsening since onset. There has been no fever. Associated symptoms include sore throat, congestion, coughing, headaches and sinus pressure. Pertinent negatives include no sneezing or n/v/diarrhea. Past treatments include nothing.     Social History   Substance Use Topics   • Smoking status: Never Smoker    • Smokeless tobacco: Not on file   • Alcohol Use: Not on file         Current Outpatient Prescriptions on File Prior to Visit   Medication Sig Dispense Refill   • mometasone (NASONEX) 50 MCG/ACT nasal spray Spray 2 Sprays in nose every day. 1 Inhaler 0   • Pseudoephedrine HCl (DECONGESTANT PO) Take  by mouth.     • VENTOLIN  (90 BASE) MCG/ACT AERS Inhale 2 Puffs by mouth every 6 hours as needed for Shortness of Breath. Please include metered dose inhaler chamber/spacer device and illustrate useage 1 Inhaler 0   • hydrocod polst-chlorphen polst (TUSSIONEX PENNKINETIC ER) 10-8 MG/5ML LQCR Take 5 mL by mouth every 12 hours as needed for Cough or Rhinitis. No driving or operation of machinery after taking this medication. Do not exceed 10 mL in a 24-hour period. 75 mL 0     No current facility-administered medications on file prior to visit.           No Known Allergies      Family history was reviewed and not pertinent     Review of Systems   Constitutional: Negative for fever.   HENT: Positive for congestion and sinus pressure. Negative for sneezing   Respiratory: Positive for cough.    Neurological: Positive for headaches.   All other systems reviewed and are negative.         Objective:     Blood pressure 130/78, pulse 88, temperature 36.4 °C (97.5 °F), height 1.753 m (5' 9\"), weight 104.327 kg (230 lb), SpO2 97 %.      Physical Exam   Constitutional: patient is oriented to person, place, and time. " Patient appears well-developed and well-nourished.   HENT:   Head: Normocephalic and atraumatic.   Right Ear: Hearing, tympanic membrane and external ear normal.   Left Ear: Hearing, tympanic membrane and external ear normal.   Nose: Mucosal edema and rhinorrhea present. No sinus tenderness. No epistaxis.  Left and right sinus tenderness noted  Mouth/Throat: Uvula is midline and mucous membranes are normal. Oropharyngeal exudate present. No posterior oropharyngeal edema or posterior oropharyngeal erythema.   Eyes: Conjunctivae and EOM are normal. Pupils are equal, round, and reactive to light. Left and right eyes - no discharge. No scleral icterus.   Neck: Normal range of motion. Neck supple. No JVD present. No tracheal deviation present. No thyromegaly present.   Cardiovascular: Normal rate, regular rhythm, normal heart sounds and intact distal pulses.    No murmur heard.  Pulmonary/Chest: Breath sounds normal. No respiratory distress.   no wheezes, rales.      Musculoskeletal: Normal range of motion.   no edema.   Lymphadenopathy:     There is positive  cervical adenopathy.   Neurological:   alert and oriented to person, place, and time.   Skin: Skin is warm and dry. No erythema.   Psychiatric:   normal mood and affect.  behavior is normal.   Nursing note and vitals reviewed.          Assessment/Plan:     1. Acute non-recurrent maxillary sinusitis     - amoxicillin-clavulanate SR (AUGMENTIN XR) 1000-62.5 MG TABLET SR 12 HR; Take 2 Tabs by mouth 2 times a day for 7 days.  Dispense: 28 Tab; Refill: 0  - mometasone (NASONEX) 50 MCG/ACT nasal spray; Spray 2 Sprays in nose every day.  Dispense: 1 Inhaler; Refill: 0    Follow up in one week if no improvement, sooner if symptoms worsen.         no

## 2018-01-09 NOTE — PHYSICAL THERAPY INITIAL EVALUATION ADULT - IMPAIRMENTS FOUND, PT EVAL
muscle strength/decreased ROM, strength, and coordination in left UE/ROM/aerobic capacity/endurance/gait, locomotion, and balance

## 2018-01-09 NOTE — PROGRESS NOTE ADULT - ASSESSMENT
Pt is a 62 M hx DM, sz, oligodendendroglioma s/p crani, chemo 2009 hx R awake crani post op some LUE weakness, discordination

## 2018-01-09 NOTE — PATIENT PROFILE ADULT. - AS SC BRADEN SENSORY
Pulmonary Progress Note      NAME: Jeffery Nascimento - ROOM: 339/169-J - MRN: [id number] - Age: 49year old - : 1966    Assessment/Plan:  1. Acute respiratory failure - intubated for airway protection. Extubated, resolved  - currently on RA  2.  Septic (36.4 °C) (Temporal)   Resp 16   Ht 5' 5.75\" (1.67 m)   Wt 216 lb 12.8 oz (98.3 kg)   LMP  (Within Years)   SpO2 99%   BMI 35.26 kg/m²   Physical Exam:   General: alert, cooperative, no respiratory distress. HEENT: Normocephalic atraumatic. Lips, mucosa, (4) no impairment

## 2018-01-09 NOTE — PROGRESS NOTE ADULT - SUBJECTIVE AND OBJECTIVE BOX
SICU AM PROGRESS NOTE :    Overnight / Interval events:    Pt doing well post procedure. Mild nausea for which reglan was given . Pt denies SOB, numbness , tingling , headache , chest pain .    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 Since then he had been on Dilantin for seizures but states that he rarely gets them. His last seizure was three weeks ago.   He is now s/p right craniotomy for resection of oligodendroglioma. EBL was 100 cc's. He received 1 L crystalloid. UOP was 500 cc's.    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    CODE STATUS: Full Code    HOME MEDICATIONS:    ALLERGIES: No Known Allergies  -------------------------------------------------------------------------------------------  Neurologic Medications:  acetaminophen   Tablet. 650 milliGRAM(s) Oral every 6 hours PRN  oxyCODONE    5 mG/acetaminophen 325 mG 1 Tablet(s) Oral every 4 hours PRN  phenytoin   Capsule 400 milliGRAM(s) Oral at bedtime    Respiratory Medications:    Cardiovascular Medications:    Gastrointestinal Medications:  dextrose 5%. 1000 milliLiter(s) IV Continuous <Continuous>  docusate sodium 100 milliGRAM(s) Oral three times a day  sodium chloride 0.9%. 1000 milliLiter(s) IV Continuous <Continuous>    Genitourinary Medications:    Hematologic/Oncologic Medications:    Antimicrobials/Immunologic Medications:  ceFAZolin   IVPB 2000 milliGRAM(s) IV Intermittent every 8 hours    Endocrine/Metabolic Medications:  dexamethasone     Tablet 4 milliGRAM(s) Oral every 6 hours  dextrose 50% Injectable 12.5 Gram(s) IV Push once  dextrose 50% Injectable 25 Gram(s) IV Push once  dextrose 50% Injectable 25 Gram(s) IV Push once  dextrose Gel 1 Dose(s) Oral once PRN  glucagon  Injectable 1 milliGRAM(s) IntraMuscular once PRN  insulin lispro (HumaLOG) corrective regimen sliding scale   SubCutaneous three times a day before meals  insulin lispro (HumaLOG) corrective regimen sliding scale   SubCutaneous at bedtime    Topical/Other Medications:  ========================================================================      VITAL SIGNS:  T(C): 37 (01-09-18 @ 00:00), Max: 37 (01-08-18 @ 15:00)  HR: 87 (01-09-18 @ 00:00) (71 - 87)  BP: 117/75 (01-08-18 @ 16:00) (100/68 - 122/70)  BP(mean): 85 (01-08-18 @ 16:00) (74 - 85)  ABP: 114/69 (01-09-18 @ 00:00) (112/61 - 144/80)  ABP(mean): 86 (01-09-18 @ 00:00) (80 - 103)  RR: 13 (01-09-18 @ 00:00) (13 - 22)  SpO2: 98% (01-09-18 @ 00:00) (95% - 100%)  Wt(kg): --  CVP(mm Hg): --  CI: --  CAPILLARY BLOOD GLUCOSE      POCT Blood Glucose.: 132 mg/dL (08 Jan 2018 23:21)  POCT Blood Glucose.: 188 mg/dL (08 Jan 2018 15:54)  POCT Blood Glucose.: 158 mg/dL (08 Jan 2018 06:47)   N/A      01-08 @ 07:01  -  01-09 @ 01:21  --------------------------------------------------------  IN:    IV PiggyBack: 50 mL    Oral Fluid: 150 mL    sodium chloride 0.9%.: 675 mL  Total IN: 875 mL    OUT:    Indwelling Catheter - Urethral: 680 mL  Total OUT: 680 mL    Total NET: 195 mL    ==============================================    PHYSICAL EXAM       NEURO  Exam: alert and oriented x 4; cranial nerves intact 5/5 strength RUE; 3/5 strength LUE, 5/5 strength b/l LE    RESPIRATORY  Exam: clear to auscultation bilaterally    CARDIOVASCULAR    Exam: S1 , S2 heard . regular rate and rhythm     GI/NUTRITION  Exam: soft, nontender, nondistended  Diet: Regular, consistent carb    GENITOURINARY/RENAL    RECENT CULTURES:      PATIENT CARE ACCESS DEVICES:  [ x] Peripheral IV  [ ] Central Venous Line	[ ] R	[ ] L	[ ] IJ	[ ] Fem	[ ] SC	Placed:   [x ] Arterial Line		[ x] R	[ ] L	[ ] Fem	[ x] Rad	[ ] Ax	Placed:   [ ] PICC:					[ ] Mediport  [x ] Urinary Catheter, Date Placed:   [x] Necessity of urinary, arterial, and venous catheters discussed    OTHER MEDICATIONS:     IMAGING STUDIES:

## 2018-01-09 NOTE — PHYSICAL THERAPY INITIAL EVALUATION ADULT - ACTIVE RANGE OF MOTION EXAMINATION, REHAB EVAL
Right UE Active ROM was WFL (within functional limits)/bilateral  lower extremity Active ROM was WFL (within functional limits)/left UE shoulder fleixon 0-140; left elbow flexion and ext WFL; left hand finger flexion and ext to 0-5 degrees

## 2018-01-10 ENCOUNTER — TRANSCRIPTION ENCOUNTER (OUTPATIENT)
Age: 63
End: 2018-01-10

## 2018-01-10 VITALS
RESPIRATION RATE: 13 BRPM | DIASTOLIC BLOOD PRESSURE: 65 MMHG | HEART RATE: 90 BPM | TEMPERATURE: 100 F | SYSTOLIC BLOOD PRESSURE: 99 MMHG | OXYGEN SATURATION: 96 %

## 2018-01-10 LAB
BUN SERPL-MCNC: 9 MG/DL — SIGNIFICANT CHANGE UP (ref 7–23)
CALCIUM SERPL-MCNC: 8.5 MG/DL — SIGNIFICANT CHANGE UP (ref 8.4–10.5)
CHLORIDE SERPL-SCNC: 100 MMOL/L — SIGNIFICANT CHANGE UP (ref 98–107)
CO2 SERPL-SCNC: 27 MMOL/L — SIGNIFICANT CHANGE UP (ref 22–31)
CREAT SERPL-MCNC: 0.82 MG/DL — SIGNIFICANT CHANGE UP (ref 0.5–1.3)
GLUCOSE BLDC GLUCOMTR-MCNC: 138 MG/DL — HIGH (ref 70–99)
GLUCOSE SERPL-MCNC: 166 MG/DL — HIGH (ref 70–99)
HCT VFR BLD CALC: 40.3 % — SIGNIFICANT CHANGE UP (ref 39–50)
HGB BLD-MCNC: 13.8 G/DL — SIGNIFICANT CHANGE UP (ref 13–17)
MAGNESIUM SERPL-MCNC: 1.9 MG/DL — SIGNIFICANT CHANGE UP (ref 1.6–2.6)
MCHC RBC-ENTMCNC: 33.9 PG — SIGNIFICANT CHANGE UP (ref 27–34)
MCHC RBC-ENTMCNC: 34.2 % — SIGNIFICANT CHANGE UP (ref 32–36)
MCV RBC AUTO: 99 FL — SIGNIFICANT CHANGE UP (ref 80–100)
NRBC # FLD: 0 — SIGNIFICANT CHANGE UP
PHOSPHATE SERPL-MCNC: 2.9 MG/DL — SIGNIFICANT CHANGE UP (ref 2.5–4.5)
PLATELET # BLD AUTO: 158 K/UL — SIGNIFICANT CHANGE UP (ref 150–400)
PMV BLD: 9 FL — SIGNIFICANT CHANGE UP (ref 7–13)
POTASSIUM SERPL-MCNC: 4 MMOL/L — SIGNIFICANT CHANGE UP (ref 3.5–5.3)
POTASSIUM SERPL-SCNC: 4 MMOL/L — SIGNIFICANT CHANGE UP (ref 3.5–5.3)
RBC # BLD: 4.07 M/UL — LOW (ref 4.2–5.8)
RBC # FLD: 12.1 % — SIGNIFICANT CHANGE UP (ref 10.3–14.5)
SODIUM SERPL-SCNC: 140 MMOL/L — SIGNIFICANT CHANGE UP (ref 135–145)
WBC # BLD: 8.14 K/UL — SIGNIFICANT CHANGE UP (ref 3.8–10.5)
WBC # FLD AUTO: 8.14 K/UL — SIGNIFICANT CHANGE UP (ref 3.8–10.5)

## 2018-01-10 PROCEDURE — 99233 SBSQ HOSP IP/OBS HIGH 50: CPT

## 2018-01-10 RX ORDER — DEXAMETHASONE 0.5 MG/5ML
2 ELIXIR ORAL
Qty: 50 | Refills: 0
Start: 2018-01-10 | End: 2018-01-19

## 2018-01-10 RX ORDER — ACETAMINOPHEN 500 MG
2 TABLET ORAL
Qty: 0 | Refills: 0 | DISCHARGE
Start: 2018-01-10

## 2018-01-10 RX ORDER — DEXAMETHASONE 0.5 MG/5ML
2 ELIXIR ORAL
Qty: 24 | Refills: 0 | OUTPATIENT
Start: 2018-01-10 | End: 2018-01-12

## 2018-01-10 RX ORDER — HEPARIN SODIUM 5000 [USP'U]/ML
5000 INJECTION INTRAVENOUS; SUBCUTANEOUS EVERY 8 HOURS
Qty: 0 | Refills: 0 | Status: DISCONTINUED | OUTPATIENT
Start: 2018-01-10 | End: 2018-01-10

## 2018-01-10 RX ORDER — DEXAMETHASONE 0.5 MG/5ML
6 ELIXIR ORAL EVERY 6 HOURS
Qty: 0 | Refills: 0 | Status: DISCONTINUED | OUTPATIENT
Start: 2018-01-10 | End: 2018-01-10

## 2018-01-10 RX ORDER — DOCUSATE SODIUM 100 MG
1 CAPSULE ORAL
Qty: 0 | Refills: 0 | DISCHARGE
Start: 2018-01-10

## 2018-01-10 RX ADMIN — Medication 100 MILLIGRAM(S): at 09:27

## 2018-01-10 RX ADMIN — Medication 4 MILLIGRAM(S): at 06:09

## 2018-01-10 RX ADMIN — Medication 4 MILLIGRAM(S): at 00:00

## 2018-01-10 RX ADMIN — Medication 6 MILLIGRAM(S): at 12:06

## 2018-01-10 RX ADMIN — Medication 100 MILLIGRAM(S): at 00:00

## 2018-01-10 NOTE — DISCHARGE NOTE ADULT - CARE PLAN
Principal Discharge DX:	Brain mass  Goal:	s/p craniotomy for resection of lesion  Instructions for follow-up, activity and diet:	activity-FWBAT, outpatient Physical therapy  Secondary Diagnosis:	Diabetes mellitus  Goal:	continue home meds  Secondary Diagnosis:	Seizure  Goal:	continue home meds, f/u with neurologist as outpatient

## 2018-01-10 NOTE — OCCUPATIONAL THERAPY INITIAL EVALUATION ADULT - DIAGNOSIS, OT EVAL
s/p stereotactic right parietal awake craniotomy for brain tumor; Left UE weakness; Decreased Left UE fine-motor coordination; Decreased ADL management

## 2018-01-10 NOTE — PROGRESS NOTE ADULT - ASSESSMENT
62 year old man s/p right craniotomy with resection of recurrent oligodendroglioma    Plan:  Neuro: q1h neuro checks. Continue dilantin for seizure ppx. Decadron to reduce swelling. MRI of head w/without contrast today - F/U results.  CV: HR and BP stable - continue to monitor. No active issues.  Resp: Incentive spirometry. Satting well on room air. No active issues.  GI: Tolerating Consistent carb diet  : Monitor I and O .  Replete electrolytes as needed    Endocrine: On ISS with good control. Monitor FS. Continue with decadron.  ID: No antibiotics   Heme:     Dispo: SICU

## 2018-01-10 NOTE — OCCUPATIONAL THERAPY INITIAL EVALUATION ADULT - GENERAL OBSERVATIONS, REHAB EVAL
Pt. received semisupine in bed in SICU. No acute distress. +Heplock, +Tele, +pulse ox to Left finger. Family present bedside.

## 2018-01-10 NOTE — OCCUPATIONAL THERAPY INITIAL EVALUATION ADULT - MD ORDER
Occupational Therapy (OT) to evaluate and treat. Occupational Therapy (OT) to evaluate and treat. Per LISA Washington, pt is okay to participate in OT evaluation and perform activity as tolerated.

## 2018-01-10 NOTE — DISCHARGE NOTE ADULT - MEDICATION SUMMARY - MEDICATIONS TO TAKE
I will START or STAY ON the medications listed below when I get home from the hospital:    dexamethasone 2 mg oral tablet  -- 2 tab(s)POQ6H x3days, then2 tabs POQ8H x 2 days, then 2mqdoOCS24U x2 days, then 8vfsODV12Cv2 days, then1 tabPOdailyx2 days, then d/cMDD:8 ta  -- It is very important that you take or use this exactly as directed.  Do not skip doses or discontinue unless directed by your doctor.  Obtain medical advice before taking any non-prescription drugs as some may affect the action of this medication.  Take with food or milk.    -- Indication: For edema    acetaminophen 325 mg oral tablet  -- 2 tab(s) by mouth every 6 hours, As needed, Mild Pain (1 - 3)  -- Indication: For prn mild pain    oxyCODONE-acetaminophen 5 mg-325 mg oral tablet  -- 1 tab(s) by mouth every 4 hours, As needed, Moderate Pain MDD:6 tabs  -- Indication: For prn moderate pain    Dilantin  -- 400 milligram(s) by mouth once a day PM  -- Indication: For Seizure ppx    metFORMIN 500 mg oral tablet  -- 1 tab(s) by mouth once a day PM Pt states, he was told by Dr. Alberto to stop 1.5 weeks ago.   -- Indication: For Diabetes mellitus    docusate sodium 100 mg oral capsule  -- 1 cap(s) by mouth 3 times a day  -- Indication: For Bowel regimen I will START or STAY ON the medications listed below when I get home from the hospital:    dexamethasone 2 mg oral tablet  -- [id number],then d/cMDD:8 tabs   -- It is very important that you take or use this exactly as directed.  Do not skip doses or discontinue unless directed by your doctor.  Obtain medical advice before taking any non-prescription drugs as some may affect the action of this medication.  Take with food or milk.    -- Indication: For edema    acetaminophen 325 mg oral tablet  -- 2 tab(s) by mouth every 6 hours, As needed, Mild Pain (1 - 3)  -- Indication: For prn mild pain    oxyCODONE-acetaminophen 5 mg-325 mg oral tablet  -- 1 tab(s) by mouth every 4 hours, As needed, Moderate Pain MDD:6 tabs  -- Indication: For prn moderate pain    Dilantin  -- 400 milligram(s) by mouth once a day PM  -- Indication: For Seizure ppx    metFORMIN 500 mg oral tablet  -- 1 tab(s) by mouth once a day PM Pt states, he was told by Dr. Alberto to stop 1.5 weeks ago.   -- Indication: For Diabetes mellitus    docusate sodium 100 mg oral capsule  -- 1 cap(s) by mouth 3 times a day  -- Indication: For Bowel regimen

## 2018-01-10 NOTE — OCCUPATIONAL THERAPY INITIAL EVALUATION ADULT - PERTINENT HX OF CURRENT PROBLEM, REHAB EVAL
Pt is a 62 year old male who presents with history of brain surgery in 2009 with subsequent chemotherapy. Pt monitored with serial MRIs. Recent testing revealed right parietal mass. Pt is now s/p stereotactic right parietal awake craniotomy for brain tumor on 1/8/18.

## 2018-01-10 NOTE — OCCUPATIONAL THERAPY INITIAL EVALUATION ADULT - LEVEL OF INDEPENDENCE: SIT/SUPINE, REHAB EVAL
Not assessed. Pt left sitting at edge of bed. No acute distress. Call bell in reach. All lines intact and precautions maintained. RNLupis made aware and acknowledged. Family present bedside.

## 2018-01-10 NOTE — OCCUPATIONAL THERAPY INITIAL EVALUATION ADULT - PLANNED THERAPY INTERVENTIONS, OT EVAL
ROM/fine motor coordination training/strengthening/ADL retraining/transfer training/bed mobility training

## 2018-01-10 NOTE — DISCHARGE NOTE ADULT - PATIENT PORTAL LINK FT
“You can access the [employer] Patient Portal, offered by [facility], by registering with the following website: [email or web address]”

## 2018-01-10 NOTE — PROGRESS NOTE ADULT - SUBJECTIVE AND OBJECTIVE BOX
SICU AM PROGRESS NOTE :    Overnight / Interval events:    No acute events overnight.  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 Since then he had been on Dilantin for seizures but states that he rarely gets them. His last seizure was three weeks ago.   He is now s/p right craniotomy for resection of oligodendroglioma. EBL was 100 cc's. He received 1 L crystalloid. UOP was 500 cc's.    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    CODE STATUS: Full Code    HOME MEDICATIONS:    ALLERGIES: No Known Allergies  -------------------------------------------------------------------------------------------  Neurologic Medications:  acetaminophen   Tablet. 650 milliGRAM(s) Oral every 6 hours PRN  oxyCODONE    5 mG/acetaminophen 325 mG 1 Tablet(s) Oral every 4 hours PRN  phenytoin   Capsule 400 milliGRAM(s) Oral at bedtime    Respiratory Medications:    Cardiovascular Medications:    Gastrointestinal Medications:  dextrose 5%. 1000 milliLiter(s) IV Continuous <Continuous>  docusate sodium 100 milliGRAM(s) Oral three times a day  sodium chloride 0.9%. 1000 milliLiter(s) IV Continuous <Continuous>    Genitourinary Medications:    Hematologic/Oncologic Medications:    Antimicrobials/Immunologic Medications:  ceFAZolin   IVPB 2000 milliGRAM(s) IV Intermittent every 8 hours    Endocrine/Metabolic Medications:  dexamethasone     Tablet 4 milliGRAM(s) Oral every 6 hours  dextrose 50% Injectable 12.5 Gram(s) IV Push once  dextrose 50% Injectable 25 Gram(s) IV Push once  dextrose 50% Injectable 25 Gram(s) IV Push once  dextrose Gel 1 Dose(s) Oral once PRN  glucagon  Injectable 1 milliGRAM(s) IntraMuscular once PRN  insulin lispro (HumaLOG) corrective regimen sliding scale   SubCutaneous three times a day before meals  insulin lispro (HumaLOG) corrective regimen sliding scale   SubCutaneous at bedtime    Topical/Other Medications:  ========================================================================      VITAL SIGNS:  T(C): 37 (01-09-18 @ 00:00), Max: 37 (01-08-18 @ 15:00)  HR: 87 (01-09-18 @ 00:00) (71 - 87)  BP: 117/75 (01-08-18 @ 16:00) (100/68 - 122/70)  BP(mean): 85 (01-08-18 @ 16:00) (74 - 85)  ABP: 114/69 (01-09-18 @ 00:00) (112/61 - 144/80)  ABP(mean): 86 (01-09-18 @ 00:00) (80 - 103)  RR: 13 (01-09-18 @ 00:00) (13 - 22)  SpO2: 98% (01-09-18 @ 00:00) (95% - 100%)  Wt(kg): --  CVP(mm Hg): --  CI: --  CAPILLARY BLOOD GLUCOSE      POCT Blood Glucose.: 132 mg/dL (08 Jan 2018 23:21)  POCT Blood Glucose.: 188 mg/dL (08 Jan 2018 15:54)  POCT Blood Glucose.: 158 mg/dL (08 Jan 2018 06:47)   N/A      01-08 @ 07:01  -  01-09 @ 01:21  --------------------------------------------------------  IN:    IV PiggyBack: 50 mL    Oral Fluid: 150 mL    sodium chloride 0.9%.: 675 mL  Total IN: 875 mL    OUT:    Indwelling Catheter - Urethral: 680 mL  Total OUT: 680 mL    Total NET: 195 mL    ==============================================    PHYSICAL EXAM       NEURO  Exam: alert and oriented x 4; cranial nerves intact 5/5 strength RUE; 3/5 strength LUE, 5/5 strength b/l LE    RESPIRATORY  Exam: clear to auscultation bilaterally    CARDIOVASCULAR    Exam: S1 , S2 heard . regular rate and rhythm     GI/NUTRITION  Exam: soft, nontender, nondistended  Diet: Regular, consistent carb    GENITOURINARY/RENAL    RECENT CULTURES:      PATIENT CARE ACCESS DEVICES:  [ x] Peripheral IV  [ ] Central Venous Line	[ ] R	[ ] L	[ ] IJ	[ ] Fem	[ ] SC	Placed:   [x ] Arterial Line		[ x] R	[ ] L	[ ] Fem	[ x] Rad	[ ] Ax	Placed:   [ ] PICC:					[ ] Mediport  [x ] Urinary Catheter, Date Placed:   [x] Necessity of urinary, arterial, and venous catheters discussed    OTHER MEDICATIONS:     IMAGING STUDIES:

## 2018-01-10 NOTE — OCCUPATIONAL THERAPY INITIAL EVALUATION ADULT - LIVES WITH, PROFILE
Pt. reports he lives with his wife and children in a house with 4 steps to enter. Once inside, pt. reports he has a full flight of steps to negotiate to reach 2nd floor where main bedroom and bathroom are located. Per pt., he has a bathtub in his bathroom.

## 2018-01-10 NOTE — DISCHARGE NOTE ADULT - HOSPITAL COURSE
This is a 63 y/o male who presents with h/o brain surgery in 2009 with subsequent chemotherapy. Monitored with serial MRIs. Recent testing revealed  right parietal mass. Scheduled for stereotactic right parietal awake craniotomy for brain tumor on 1-8-18.  Patient tolerated procedure well, was observed in the SICU x 48 hours.  He was found to have minimal left  weakness post operatively and post op MRI of the brain showed New postop changes are identified compatible with a high right frontal parietal craniotomy near the right vertex. Subtle T1 shortening is identified involving the peripheral aspect of the postop bed which is likely compatible areas of hemorrhage and or postop material. There is also subtle enhancement seen around the postop bed. This finding is likely related to postop changes though clinical correlation and continued close and follow-up is recommended. Small amount of air is identified postop bed. Abnormal T2 prolongation around the postop region is identified as well. While this could be related to postop changes, the possibility of residual tumor cannot be entirely excluded.  Patients steroids were increased and will be discharged on a decadron taper over 10 days.  He was seen by physical and occupational therapy, who recommended outpatient PT and..... This is a 63 y/o male who presents with h/o brain surgery in 2009 with subsequent chemotherapy. Monitored with serial MRIs. Recent testing revealed  right parietal mass. Scheduled for stereotactic right parietal awake craniotomy for brain tumor on 1-8-18.  Patient tolerated procedure well, was observed in the SICU x 48 hours.  He was found to have minimal left  weakness post operatively and post op MRI of the brain showed New postop changes are identified compatible with a high right frontal parietal craniotomy near the right vertex. Subtle T1 shortening is identified involving the peripheral aspect of the postop bed which is likely compatible areas of hemorrhage and or postop material. There is also subtle enhancement seen around the postop bed. This finding is likely related to postop changes though clinical correlation and continued close and follow-up is recommended. Small amount of air is identified postop bed. Abnormal T2 prolongation around the postop region is identified as well. While this could be related to postop changes, the possibility of residual tumor cannot be entirely excluded.  Patients steroids were increased and will be discharged on a decadron taper over 10 days.  He was seen by physical and occupational therapy, who recommended d/c home with outpatient PT/OT.

## 2018-01-10 NOTE — DISCHARGE NOTE ADULT - NS AS ACTIVITY OBS
may shower on friday, wash hair/No Heavy lifting/straining/Walking-Outdoors allowed/Stairs allowed/Do not make important decisions/Do not drive or operate machinery/Walking-Indoors allowed

## 2018-01-10 NOTE — DISCHARGE NOTE ADULT - CARE PROVIDER_API CALL
Primo Ramírez), Neurological Surgery; Pediatric Neurological Surgery  [address]  [address]  [place], [place] [id number]  Phone: [phone]  Fax: [phone]    Albert Torres), Neurology  [address]  [address]  [place], [place] [id number]  Phone: [phone]  Fax: [phone]

## 2018-01-10 NOTE — DISCHARGE NOTE ADULT - PLAN OF CARE
s/p craniotomy for resection of lesion activity-FWBAT, outpatient Physical therapy continue home meds continue home meds, f/u with neurologist as outpatient

## 2018-01-11 ENCOUNTER — OUTPATIENT (OUTPATIENT)
Dept: OUTPATIENT SERVICES | Facility: HOSPITAL | Age: 63
LOS: 1 days | End: 2018-01-11
Payer: COMMERCIAL

## 2018-01-11 DIAGNOSIS — G93.9 DISORDER OF BRAIN, UNSPECIFIED: Chronic | ICD-10-CM

## 2018-01-11 PROCEDURE — 81403 MOPATH PROCEDURE LEVEL 4: CPT

## 2018-01-12 ENCOUNTER — OUTPATIENT (OUTPATIENT)
Dept: OUTPATIENT SERVICES | Facility: HOSPITAL | Age: 63
LOS: 1 days | End: 2018-01-12

## 2018-01-12 DIAGNOSIS — G93.9 DISORDER OF BRAIN, UNSPECIFIED: Chronic | ICD-10-CM

## 2018-01-12 LAB — SURGICAL PATHOLOGY STUDY: SIGNIFICANT CHANGE UP

## 2018-01-17 ENCOUNTER — TRANSCRIPTION ENCOUNTER (OUTPATIENT)
Age: 63
End: 2018-01-17

## 2018-01-23 ENCOUNTER — APPOINTMENT (OUTPATIENT)
Dept: RADIATION ONCOLOGY | Facility: CLINIC | Age: 63
End: 2018-01-23
Payer: COMMERCIAL

## 2018-01-23 ENCOUNTER — OUTPATIENT (OUTPATIENT)
Dept: OUTPATIENT SERVICES | Facility: HOSPITAL | Age: 63
LOS: 1 days | Discharge: HOME | End: 2018-01-23

## 2018-01-23 ENCOUNTER — RESULT REVIEW (OUTPATIENT)
Age: 63
End: 2018-01-23

## 2018-01-23 ENCOUNTER — APPOINTMENT (OUTPATIENT)
Dept: NEUROLOGY | Facility: CLINIC | Age: 63
End: 2018-01-23
Payer: COMMERCIAL

## 2018-01-23 ENCOUNTER — APPOINTMENT (OUTPATIENT)
Dept: HEMATOLOGY ONCOLOGY | Facility: CLINIC | Age: 63
End: 2018-01-23

## 2018-01-23 VITALS
DIASTOLIC BLOOD PRESSURE: 70 MMHG | OXYGEN SATURATION: 96 % | HEART RATE: 78 BPM | WEIGHT: 150.35 LBS | TEMPERATURE: 97.7 F | SYSTOLIC BLOOD PRESSURE: 117 MMHG | HEIGHT: 67 IN | BODY MASS INDEX: 23.6 KG/M2 | RESPIRATION RATE: 15 BRPM

## 2018-01-23 VITALS
DIASTOLIC BLOOD PRESSURE: 80 MMHG | RESPIRATION RATE: 16 BRPM | BODY MASS INDEX: 23.54 KG/M2 | WEIGHT: 150 LBS | HEART RATE: 92 BPM | OXYGEN SATURATION: 98 % | HEIGHT: 67 IN | SYSTOLIC BLOOD PRESSURE: 120 MMHG

## 2018-01-23 DIAGNOSIS — D64.9 ANEMIA, UNSPECIFIED: ICD-10-CM

## 2018-01-23 DIAGNOSIS — E11.9 TYPE 2 DIABETES MELLITUS W/OUT COMPLICATIONS: ICD-10-CM

## 2018-01-23 DIAGNOSIS — Z78.9 OTHER SPECIFIED HEALTH STATUS: ICD-10-CM

## 2018-01-23 DIAGNOSIS — R56.9 UNSPECIFIED CONVULSIONS: ICD-10-CM

## 2018-01-23 DIAGNOSIS — G93.9 DISORDER OF BRAIN, UNSPECIFIED: Chronic | ICD-10-CM

## 2018-01-23 PROCEDURE — 77263 THER RADIOLOGY TX PLNG CPLX: CPT

## 2018-01-23 PROCEDURE — 99204 OFFICE O/P NEW MOD 45 MIN: CPT

## 2018-01-23 PROCEDURE — 99245 OFF/OP CONSLTJ NEW/EST HI 55: CPT | Mod: 25,GC

## 2018-01-23 RX ORDER — PHENYTOIN SODIUM 200 MG/1
200 CAPSULE, EXTENDED RELEASE ORAL
Refills: 0 | Status: DISCONTINUED | COMMUNITY
Start: 2018-01-23 | End: 2018-01-23

## 2018-01-24 PROBLEM — Z78.9 NO FAMILY HISTORY OF CANCER: Status: ACTIVE | Noted: 2018-01-24

## 2018-01-24 PROBLEM — E11.9 DIABETES MELLITUS: Status: RESOLVED | Noted: 2018-01-24 | Resolved: 2018-01-24

## 2018-01-25 LAB
ALBUMIN SERPL ELPH-MCNC: 4 G/DL
ALP BLD-CCNC: 85 U/L
ALT SERPL-CCNC: 17 U/L
ANION GAP SERPL CALC-SCNC: 12 MMOL/L
AST SERPL-CCNC: 15 U/L
BILIRUB SERPL-MCNC: 0.2 MG/DL
BUN SERPL-MCNC: 16 MG/DL
CALCIUM SERPL-MCNC: 9.4 MG/DL
CHLORIDE SERPL-SCNC: 97 MMOL/L
CO2 SERPL-SCNC: 27 MMOL/L
CREAT SERPL-MCNC: 0.93 MG/DL
GLUCOSE SERPL-MCNC: 158 MG/DL
POTASSIUM SERPL-SCNC: 4 MMOL/L
PROT SERPL-MCNC: 6.7 G/DL
SODIUM SERPL-SCNC: 136 MMOL/L

## 2018-02-01 PROCEDURE — 77301 RADIOTHERAPY DOSE PLAN IMRT: CPT | Mod: 26

## 2018-02-01 PROCEDURE — 77338 DESIGN MLC DEVICE FOR IMRT: CPT | Mod: 26

## 2018-02-01 PROCEDURE — 77300 RADIATION THERAPY DOSE PLAN: CPT | Mod: 26

## 2018-02-02 ENCOUNTER — OTHER (OUTPATIENT)
Age: 63
End: 2018-02-02

## 2018-02-06 VITALS
SYSTOLIC BLOOD PRESSURE: 125 MMHG | WEIGHT: 153.44 LBS | HEIGHT: 67 IN | OXYGEN SATURATION: 99 % | RESPIRATION RATE: 15 BRPM | BODY MASS INDEX: 24.08 KG/M2 | HEART RATE: 77 BPM | TEMPERATURE: 98.1 F | DIASTOLIC BLOOD PRESSURE: 84 MMHG

## 2018-02-07 PROCEDURE — 77387B: CUSTOM | Mod: 26

## 2018-02-08 PROCEDURE — 77387B: CUSTOM | Mod: 26

## 2018-02-09 PROCEDURE — 77387B: CUSTOM | Mod: 26

## 2018-02-12 VITALS
SYSTOLIC BLOOD PRESSURE: 116 MMHG | RESPIRATION RATE: 14 BRPM | WEIGHT: 152.12 LBS | HEART RATE: 72 BPM | BODY MASS INDEX: 23.88 KG/M2 | OXYGEN SATURATION: 98 % | TEMPERATURE: 97.88 F | HEIGHT: 67 IN | DIASTOLIC BLOOD PRESSURE: 74 MMHG

## 2018-02-12 VITALS
SYSTOLIC BLOOD PRESSURE: 116 MMHG | DIASTOLIC BLOOD PRESSURE: 74 MMHG | WEIGHT: 152.12 LBS | HEART RATE: 72 BPM | TEMPERATURE: 97.88 F | RESPIRATION RATE: 14 BRPM | OXYGEN SATURATION: 98 % | BODY MASS INDEX: 23.83 KG/M2

## 2018-02-12 PROCEDURE — 77387B: CUSTOM | Mod: 26

## 2018-02-13 ENCOUNTER — RESULT REVIEW (OUTPATIENT)
Age: 63
End: 2018-02-13

## 2018-02-13 ENCOUNTER — APPOINTMENT (OUTPATIENT)
Dept: HEMATOLOGY ONCOLOGY | Facility: CLINIC | Age: 63
End: 2018-02-13

## 2018-02-13 PROCEDURE — 77387B: CUSTOM | Mod: 26

## 2018-02-13 PROCEDURE — 77427 RADIATION TX MANAGEMENT X5: CPT

## 2018-02-14 PROCEDURE — 77387B: CUSTOM | Mod: 26

## 2018-02-15 PROCEDURE — 77387B: CUSTOM | Mod: 26

## 2018-02-16 PROCEDURE — 77387B: CUSTOM | Mod: 26

## 2018-02-20 ENCOUNTER — APPOINTMENT (OUTPATIENT)
Dept: HEMATOLOGY ONCOLOGY | Facility: CLINIC | Age: 63
End: 2018-02-20

## 2018-02-20 ENCOUNTER — RESULT REVIEW (OUTPATIENT)
Age: 63
End: 2018-02-20

## 2018-02-20 VITALS
RESPIRATION RATE: 16 BRPM | SYSTOLIC BLOOD PRESSURE: 109 MMHG | BODY MASS INDEX: 22.43 KG/M2 | OXYGEN SATURATION: 96 % | HEART RATE: 70 BPM | WEIGHT: 143.19 LBS | DIASTOLIC BLOOD PRESSURE: 74 MMHG

## 2018-02-20 LAB
BASOPHILS # BLD AUTO: 0 K/UL — SIGNIFICANT CHANGE UP (ref 0–0.2)
BASOPHILS NFR BLD AUTO: 0.5 % — SIGNIFICANT CHANGE UP (ref 0–2)
EOSINOPHIL # BLD AUTO: 0.1 K/UL — SIGNIFICANT CHANGE UP (ref 0–0.5)
EOSINOPHIL NFR BLD AUTO: 1.8 % — SIGNIFICANT CHANGE UP (ref 0–6)
HCT VFR BLD CALC: 44.3 % — SIGNIFICANT CHANGE UP (ref 39–50)
HGB BLD-MCNC: 14.9 G/DL — SIGNIFICANT CHANGE UP (ref 13–17)
LYMPHOCYTES # BLD AUTO: 1.2 K/UL — SIGNIFICANT CHANGE UP (ref 1–3.3)
LYMPHOCYTES # BLD AUTO: 21.4 % — SIGNIFICANT CHANGE UP (ref 13–44)
MCHC RBC-ENTMCNC: 32.6 PG — SIGNIFICANT CHANGE UP (ref 27–34)
MCHC RBC-ENTMCNC: 33.6 G/DL — SIGNIFICANT CHANGE UP (ref 32–36)
MCV RBC AUTO: 96.9 FL — SIGNIFICANT CHANGE UP (ref 80–100)
MONOCYTES # BLD AUTO: 0.5 K/UL — SIGNIFICANT CHANGE UP (ref 0–0.9)
MONOCYTES NFR BLD AUTO: 8 % — SIGNIFICANT CHANGE UP (ref 2–14)
NEUTROPHILS # BLD AUTO: 4 K/UL — SIGNIFICANT CHANGE UP (ref 1.8–7.4)
NEUTROPHILS NFR BLD AUTO: 68.4 % — SIGNIFICANT CHANGE UP (ref 43–77)
PLATELET # BLD AUTO: 204 K/UL — SIGNIFICANT CHANGE UP (ref 150–400)
RBC # BLD: 4.58 M/UL — SIGNIFICANT CHANGE UP (ref 4.2–5.8)
RBC # FLD: 11.5 % — SIGNIFICANT CHANGE UP (ref 10.3–14.5)
WBC # BLD: 5.8 K/UL — SIGNIFICANT CHANGE UP (ref 3.8–10.5)
WBC # FLD AUTO: 5.8 K/UL — SIGNIFICANT CHANGE UP (ref 3.8–10.5)

## 2018-02-20 PROCEDURE — 77387B: CUSTOM | Mod: 26

## 2018-02-21 PROCEDURE — 77387B: CUSTOM | Mod: 26

## 2018-02-21 PROCEDURE — 77427 RADIATION TX MANAGEMENT X5: CPT

## 2018-02-22 PROCEDURE — 77387B: CUSTOM | Mod: 26

## 2018-02-23 PROCEDURE — 77387B: CUSTOM | Mod: 26

## 2018-02-26 PROCEDURE — 77387B: CUSTOM | Mod: 26

## 2018-02-27 VITALS
SYSTOLIC BLOOD PRESSURE: 106 MMHG | DIASTOLIC BLOOD PRESSURE: 72 MMHG | OXYGEN SATURATION: 98 % | RESPIRATION RATE: 16 BRPM | HEART RATE: 71 BPM | TEMPERATURE: 97.7 F

## 2018-02-27 PROCEDURE — 77387B: CUSTOM | Mod: 26

## 2018-02-28 ENCOUNTER — RESULT REVIEW (OUTPATIENT)
Age: 63
End: 2018-02-28

## 2018-02-28 ENCOUNTER — APPOINTMENT (OUTPATIENT)
Dept: HEMATOLOGY ONCOLOGY | Facility: CLINIC | Age: 63
End: 2018-02-28

## 2018-02-28 ENCOUNTER — OUTPATIENT (OUTPATIENT)
Dept: OUTPATIENT SERVICES | Facility: HOSPITAL | Age: 63
LOS: 1 days | Discharge: HOME | End: 2018-02-28

## 2018-02-28 DIAGNOSIS — D64.9 ANEMIA, UNSPECIFIED: ICD-10-CM

## 2018-02-28 DIAGNOSIS — G93.9 DISORDER OF BRAIN, UNSPECIFIED: Chronic | ICD-10-CM

## 2018-02-28 LAB
HCT VFR BLD CALC: 44.7 % — SIGNIFICANT CHANGE UP (ref 39–50)
HGB BLD-MCNC: 15.5 G/DL — SIGNIFICANT CHANGE UP (ref 13–17)
MCHC RBC-ENTMCNC: 33.7 PG — SIGNIFICANT CHANGE UP (ref 27–34)
MCHC RBC-ENTMCNC: 34.7 G/DL — SIGNIFICANT CHANGE UP (ref 32–36)
MCV RBC AUTO: 97.1 FL — SIGNIFICANT CHANGE UP (ref 80–100)
PLATELET # BLD AUTO: 227 K/UL — SIGNIFICANT CHANGE UP (ref 150–400)
RBC # BLD: 4.61 M/UL — SIGNIFICANT CHANGE UP (ref 4.2–5.8)
RBC # FLD: 11.4 % — SIGNIFICANT CHANGE UP (ref 10.3–14.5)
WBC # BLD: 5.3 K/UL — SIGNIFICANT CHANGE UP (ref 3.8–10.5)
WBC # FLD AUTO: 5.3 K/UL — SIGNIFICANT CHANGE UP (ref 3.8–10.5)

## 2018-02-28 PROCEDURE — 77387B: CUSTOM | Mod: 26

## 2018-02-28 PROCEDURE — 77427 RADIATION TX MANAGEMENT X5: CPT

## 2018-03-01 PROCEDURE — 77387B: CUSTOM | Mod: 26

## 2018-03-02 PROCEDURE — 77387B: CUSTOM | Mod: 26

## 2018-03-05 PROCEDURE — 77387B: CUSTOM | Mod: 26

## 2018-03-06 ENCOUNTER — RESULT REVIEW (OUTPATIENT)
Age: 63
End: 2018-03-06

## 2018-03-06 ENCOUNTER — APPOINTMENT (OUTPATIENT)
Age: 63
End: 2018-03-06

## 2018-03-06 VITALS
SYSTOLIC BLOOD PRESSURE: 109 MMHG | HEART RATE: 69 BPM | DIASTOLIC BLOOD PRESSURE: 70 MMHG | WEIGHT: 152.12 LBS | BODY MASS INDEX: 23.82 KG/M2 | RESPIRATION RATE: 16 BRPM | TEMPERATURE: 97.4 F | OXYGEN SATURATION: 98 %

## 2018-03-06 LAB
BASOPHILS # BLD AUTO: 0 K/UL — SIGNIFICANT CHANGE UP (ref 0–0.2)
BASOPHILS NFR BLD AUTO: 0 % — SIGNIFICANT CHANGE UP (ref 0–2)
EOSINOPHIL # BLD AUTO: 0.1 K/UL — SIGNIFICANT CHANGE UP (ref 0–0.5)
EOSINOPHIL NFR BLD AUTO: 2.1 % — SIGNIFICANT CHANGE UP (ref 0–6)
HCT VFR BLD CALC: 46.1 % — SIGNIFICANT CHANGE UP (ref 39–50)
HGB BLD-MCNC: 16.2 G/DL — SIGNIFICANT CHANGE UP (ref 13–17)
LYMPHOCYTES # BLD AUTO: 1 K/UL — SIGNIFICANT CHANGE UP (ref 1–3.3)
LYMPHOCYTES # BLD AUTO: 18.5 % — SIGNIFICANT CHANGE UP (ref 13–44)
MCHC RBC-ENTMCNC: 34.2 PG — HIGH (ref 27–34)
MCHC RBC-ENTMCNC: 35.2 G/DL — SIGNIFICANT CHANGE UP (ref 32–36)
MCV RBC AUTO: 97.2 FL — SIGNIFICANT CHANGE UP (ref 80–100)
MONOCYTES # BLD AUTO: 0.4 K/UL — SIGNIFICANT CHANGE UP (ref 0–0.9)
MONOCYTES NFR BLD AUTO: 7.9 % — SIGNIFICANT CHANGE UP (ref 2–14)
NEUTROPHILS # BLD AUTO: 4 K/UL — SIGNIFICANT CHANGE UP (ref 1.8–7.4)
NEUTROPHILS NFR BLD AUTO: 71.4 % — SIGNIFICANT CHANGE UP (ref 43–77)
PLATELET # BLD AUTO: 251 K/UL — SIGNIFICANT CHANGE UP (ref 150–400)
RBC # BLD: 4.74 M/UL — SIGNIFICANT CHANGE UP (ref 4.2–5.8)
RBC # FLD: 11.5 % — SIGNIFICANT CHANGE UP (ref 10.3–14.5)
WBC # BLD: 5.6 K/UL — SIGNIFICANT CHANGE UP (ref 3.8–10.5)
WBC # FLD AUTO: 5.6 K/UL — SIGNIFICANT CHANGE UP (ref 3.8–10.5)

## 2018-03-06 PROCEDURE — 77427 RADIATION TX MANAGEMENT X5: CPT

## 2018-03-06 PROCEDURE — 77387B: CUSTOM | Mod: 26

## 2018-03-08 PROCEDURE — 77387B: CUSTOM | Mod: 26

## 2018-03-09 PROCEDURE — 77387B: CUSTOM | Mod: 26

## 2018-03-10 PROCEDURE — 77387B: CUSTOM | Mod: 26

## 2018-03-12 PROCEDURE — 77387B: CUSTOM | Mod: 26

## 2018-03-13 ENCOUNTER — RESULT REVIEW (OUTPATIENT)
Age: 63
End: 2018-03-13

## 2018-03-13 ENCOUNTER — APPOINTMENT (OUTPATIENT)
Dept: HEMATOLOGY ONCOLOGY | Facility: CLINIC | Age: 63
End: 2018-03-13

## 2018-03-13 VITALS
DIASTOLIC BLOOD PRESSURE: 65 MMHG | BODY MASS INDEX: 24.2 KG/M2 | HEART RATE: 78 BPM | SYSTOLIC BLOOD PRESSURE: 112 MMHG | OXYGEN SATURATION: 97 % | RESPIRATION RATE: 18 BRPM | WEIGHT: 154.54 LBS

## 2018-03-13 DIAGNOSIS — D49.6 NEOPLASM OF UNSPECIFIED BEHAVIOR OF BRAIN: ICD-10-CM

## 2018-03-13 LAB
BASOPHILS # BLD AUTO: 0 K/UL — SIGNIFICANT CHANGE UP (ref 0–0.2)
BASOPHILS NFR BLD AUTO: 0.1 % — SIGNIFICANT CHANGE UP (ref 0–2)
EOSINOPHIL # BLD AUTO: 0.2 K/UL — SIGNIFICANT CHANGE UP (ref 0–0.5)
EOSINOPHIL NFR BLD AUTO: 2.8 % — SIGNIFICANT CHANGE UP (ref 0–6)
HCT VFR BLD CALC: 44.3 % — SIGNIFICANT CHANGE UP (ref 39–50)
HGB BLD-MCNC: 15 G/DL — SIGNIFICANT CHANGE UP (ref 13–17)
LYMPHOCYTES # BLD AUTO: 0.8 K/UL — LOW (ref 1–3.3)
LYMPHOCYTES # BLD AUTO: 15.8 % — SIGNIFICANT CHANGE UP (ref 13–44)
MCHC RBC-ENTMCNC: 33 PG — SIGNIFICANT CHANGE UP (ref 27–34)
MCHC RBC-ENTMCNC: 33.9 G/DL — SIGNIFICANT CHANGE UP (ref 32–36)
MCV RBC AUTO: 97.6 FL — SIGNIFICANT CHANGE UP (ref 80–100)
MONOCYTES # BLD AUTO: 0.4 K/UL — SIGNIFICANT CHANGE UP (ref 0–0.9)
MONOCYTES NFR BLD AUTO: 7.8 % — SIGNIFICANT CHANGE UP (ref 2–14)
NEUTROPHILS # BLD AUTO: 4 K/UL — SIGNIFICANT CHANGE UP (ref 1.8–7.4)
NEUTROPHILS NFR BLD AUTO: 73.4 % — SIGNIFICANT CHANGE UP (ref 43–77)
PLATELET # BLD AUTO: 217 K/UL — SIGNIFICANT CHANGE UP (ref 150–400)
RBC # BLD: 4.54 M/UL — SIGNIFICANT CHANGE UP (ref 4.2–5.8)
RBC # FLD: 11.6 % — SIGNIFICANT CHANGE UP (ref 10.3–14.5)
WBC # BLD: 5.4 K/UL — SIGNIFICANT CHANGE UP (ref 3.8–10.5)
WBC # FLD AUTO: 5.4 K/UL — SIGNIFICANT CHANGE UP (ref 3.8–10.5)

## 2018-03-13 PROCEDURE — 77387B: CUSTOM | Mod: 26

## 2018-03-13 PROCEDURE — 77427 RADIATION TX MANAGEMENT X5: CPT

## 2018-03-14 PROCEDURE — 77387B: CUSTOM | Mod: 26

## 2018-03-15 ENCOUNTER — APPOINTMENT (OUTPATIENT)
Dept: NEUROLOGY | Facility: CLINIC | Age: 63
End: 2018-03-15
Payer: COMMERCIAL

## 2018-03-15 ENCOUNTER — APPOINTMENT (OUTPATIENT)
Dept: NEUROLOGY | Facility: CLINIC | Age: 63
End: 2018-03-15

## 2018-03-15 VITALS
HEIGHT: 67 IN | TEMPERATURE: 97.9 F | HEART RATE: 75 BPM | SYSTOLIC BLOOD PRESSURE: 112 MMHG | OXYGEN SATURATION: 97 % | DIASTOLIC BLOOD PRESSURE: 77 MMHG

## 2018-03-15 PROCEDURE — 77387B: CUSTOM | Mod: 26

## 2018-03-15 PROCEDURE — 99214 OFFICE O/P EST MOD 30 MIN: CPT

## 2018-03-16 PROCEDURE — 77387B: CUSTOM | Mod: 26

## 2018-03-19 PROCEDURE — 77387B: CUSTOM | Mod: 26

## 2018-03-20 VITALS
RESPIRATION RATE: 18 BRPM | WEIGHT: 155.21 LBS | DIASTOLIC BLOOD PRESSURE: 71 MMHG | HEART RATE: 78 BPM | OXYGEN SATURATION: 97 % | SYSTOLIC BLOOD PRESSURE: 105 MMHG | BODY MASS INDEX: 24.31 KG/M2

## 2018-03-20 PROCEDURE — 77427 RADIATION TX MANAGEMENT X5: CPT

## 2018-03-20 PROCEDURE — 77387B: CUSTOM | Mod: 26

## 2018-03-21 ENCOUNTER — RESULT REVIEW (OUTPATIENT)
Age: 63
End: 2018-03-21

## 2018-03-21 ENCOUNTER — APPOINTMENT (OUTPATIENT)
Dept: HEMATOLOGY ONCOLOGY | Facility: CLINIC | Age: 63
End: 2018-03-21

## 2018-03-21 LAB
HCT VFR BLD CALC: 43.7 % — SIGNIFICANT CHANGE UP (ref 39–50)
HGB BLD-MCNC: 15.6 G/DL — SIGNIFICANT CHANGE UP (ref 13–17)
MCHC RBC-ENTMCNC: 34.5 PG — HIGH (ref 27–34)
MCHC RBC-ENTMCNC: 35.7 G/DL — SIGNIFICANT CHANGE UP (ref 32–36)
MCV RBC AUTO: 96.8 FL — SIGNIFICANT CHANGE UP (ref 80–100)
PLATELET # BLD AUTO: 198 K/UL — SIGNIFICANT CHANGE UP (ref 150–400)
RBC # BLD: 4.52 M/UL — SIGNIFICANT CHANGE UP (ref 4.2–5.8)
RBC # FLD: 11.5 % — SIGNIFICANT CHANGE UP (ref 10.3–14.5)
WBC # BLD: 4.5 K/UL — SIGNIFICANT CHANGE UP (ref 3.8–10.5)
WBC # FLD AUTO: 4.5 K/UL — SIGNIFICANT CHANGE UP (ref 3.8–10.5)

## 2018-03-21 PROCEDURE — 77387B: CUSTOM | Mod: 26

## 2018-04-26 ENCOUNTER — APPOINTMENT (OUTPATIENT)
Dept: NEUROLOGY | Facility: CLINIC | Age: 63
End: 2018-04-26
Payer: COMMERCIAL

## 2018-04-26 ENCOUNTER — OUTPATIENT (OUTPATIENT)
Dept: OUTPATIENT SERVICES | Facility: HOSPITAL | Age: 63
LOS: 1 days | Discharge: HOME | End: 2018-04-26

## 2018-04-26 ENCOUNTER — APPOINTMENT (OUTPATIENT)
Dept: RADIATION ONCOLOGY | Facility: CLINIC | Age: 63
End: 2018-04-26
Payer: COMMERCIAL

## 2018-04-26 ENCOUNTER — APPOINTMENT (OUTPATIENT)
Dept: MRI IMAGING | Facility: IMAGING CENTER | Age: 63
End: 2018-04-26
Payer: SELF-PAY

## 2018-04-26 ENCOUNTER — APPOINTMENT (OUTPATIENT)
Dept: HEMATOLOGY ONCOLOGY | Facility: CLINIC | Age: 63
End: 2018-04-26

## 2018-04-26 ENCOUNTER — OUTPATIENT (OUTPATIENT)
Dept: OUTPATIENT SERVICES | Facility: HOSPITAL | Age: 63
LOS: 1 days | End: 2018-04-26
Payer: COMMERCIAL

## 2018-04-26 ENCOUNTER — RESULT REVIEW (OUTPATIENT)
Age: 63
End: 2018-04-26

## 2018-04-26 VITALS
SYSTOLIC BLOOD PRESSURE: 115 MMHG | WEIGHT: 154 LBS | OXYGEN SATURATION: 98 % | HEART RATE: 69 BPM | BODY MASS INDEX: 24.17 KG/M2 | HEIGHT: 67 IN | RESPIRATION RATE: 16 BRPM | DIASTOLIC BLOOD PRESSURE: 72 MMHG | TEMPERATURE: 97.9 F

## 2018-04-26 DIAGNOSIS — G93.9 DISORDER OF BRAIN, UNSPECIFIED: Chronic | ICD-10-CM

## 2018-04-26 DIAGNOSIS — Z00.8 ENCOUNTER FOR OTHER GENERAL EXAMINATION: ICD-10-CM

## 2018-04-26 DIAGNOSIS — D64.9 ANEMIA, UNSPECIFIED: ICD-10-CM

## 2018-04-26 LAB
HCT VFR BLD CALC: 45.2 % — SIGNIFICANT CHANGE UP (ref 39–50)
HGB BLD-MCNC: 15.8 G/DL — SIGNIFICANT CHANGE UP (ref 13–17)
MCHC RBC-ENTMCNC: 33.7 PG — SIGNIFICANT CHANGE UP (ref 27–34)
MCHC RBC-ENTMCNC: 35 G/DL — SIGNIFICANT CHANGE UP (ref 32–36)
MCV RBC AUTO: 96.1 FL — SIGNIFICANT CHANGE UP (ref 80–100)
PLATELET # BLD AUTO: 184 K/UL — SIGNIFICANT CHANGE UP (ref 150–400)
RBC # BLD: 4.71 M/UL — SIGNIFICANT CHANGE UP (ref 4.2–5.8)
RBC # FLD: 11.3 % — SIGNIFICANT CHANGE UP (ref 10.3–14.5)
WBC # BLD: 5.1 K/UL — SIGNIFICANT CHANGE UP (ref 3.8–10.5)
WBC # FLD AUTO: 5.1 K/UL — SIGNIFICANT CHANGE UP (ref 3.8–10.5)

## 2018-04-26 PROCEDURE — 82565 ASSAY OF CREATININE: CPT

## 2018-04-26 PROCEDURE — 70553 MRI BRAIN STEM W/O & W/DYE: CPT

## 2018-04-26 PROCEDURE — 70553 MRI BRAIN STEM W/O & W/DYE: CPT | Mod: 26

## 2018-04-26 PROCEDURE — 99024 POSTOP FOLLOW-UP VISIT: CPT | Mod: GC

## 2018-04-26 PROCEDURE — 99214 OFFICE O/P EST MOD 30 MIN: CPT

## 2018-04-26 PROCEDURE — A9585: CPT

## 2018-04-26 RX ORDER — ONDANSETRON 4 MG/1
4 TABLET ORAL
Qty: 90 | Refills: 1 | Status: DISCONTINUED | COMMUNITY
Start: 2018-01-23 | End: 2018-04-26

## 2018-04-26 RX ORDER — TEMOZOLOMIDE 140 MG/1
140 CAPSULE ORAL
Qty: 42 | Refills: 0 | Status: DISCONTINUED | COMMUNITY
Start: 2018-01-23 | End: 2018-04-26

## 2018-04-27 LAB
ALBUMIN SERPL ELPH-MCNC: 4.3 G/DL
ALP BLD-CCNC: 71 U/L
ALT SERPL-CCNC: 10 U/L
ANION GAP SERPL CALC-SCNC: 15 MMOL/L
AST SERPL-CCNC: 7 U/L
BILIRUB SERPL-MCNC: 0.3 MG/DL
BUN SERPL-MCNC: 15 MG/DL
CALCIUM SERPL-MCNC: 9.6 MG/DL
CHLORIDE SERPL-SCNC: 102 MMOL/L
CO2 SERPL-SCNC: 26 MMOL/L
CREAT SERPL-MCNC: 0.87 MG/DL
GLUCOSE SERPL-MCNC: 164 MG/DL
POTASSIUM SERPL-SCNC: 5.1 MMOL/L
PROT SERPL-MCNC: 7 G/DL
SODIUM SERPL-SCNC: 143 MMOL/L

## 2018-05-07 ENCOUNTER — APPOINTMENT (OUTPATIENT)
Dept: HEMATOLOGY ONCOLOGY | Facility: CLINIC | Age: 63
End: 2018-05-07

## 2018-05-07 ENCOUNTER — OUTPATIENT (OUTPATIENT)
Dept: OUTPATIENT SERVICES | Facility: HOSPITAL | Age: 63
LOS: 1 days | Discharge: HOME | End: 2018-05-07

## 2018-05-07 ENCOUNTER — RESULT REVIEW (OUTPATIENT)
Age: 63
End: 2018-05-07

## 2018-05-07 DIAGNOSIS — D64.9 ANEMIA, UNSPECIFIED: ICD-10-CM

## 2018-05-07 DIAGNOSIS — G93.9 DISORDER OF BRAIN, UNSPECIFIED: Chronic | ICD-10-CM

## 2018-05-07 LAB
BASOPHILS # BLD AUTO: 0 K/UL — SIGNIFICANT CHANGE UP (ref 0–0.2)
BASOPHILS NFR BLD AUTO: 0.3 % — SIGNIFICANT CHANGE UP (ref 0–2)
EOSINOPHIL # BLD AUTO: 0.1 K/UL — SIGNIFICANT CHANGE UP (ref 0–0.5)
EOSINOPHIL NFR BLD AUTO: 1.8 % — SIGNIFICANT CHANGE UP (ref 0–6)
HCT VFR BLD CALC: 45.5 % — SIGNIFICANT CHANGE UP (ref 39–50)
HGB BLD-MCNC: 16 G/DL — SIGNIFICANT CHANGE UP (ref 13–17)
LYMPHOCYTES # BLD AUTO: 1 K/UL — SIGNIFICANT CHANGE UP (ref 1–3.3)
LYMPHOCYTES # BLD AUTO: 18.8 % — SIGNIFICANT CHANGE UP (ref 13–44)
MCHC RBC-ENTMCNC: 33.8 PG — SIGNIFICANT CHANGE UP (ref 27–34)
MCHC RBC-ENTMCNC: 35.3 G/DL — SIGNIFICANT CHANGE UP (ref 32–36)
MCV RBC AUTO: 95.8 FL — SIGNIFICANT CHANGE UP (ref 80–100)
MONOCYTES # BLD AUTO: 0.4 K/UL — SIGNIFICANT CHANGE UP (ref 0–0.9)
MONOCYTES NFR BLD AUTO: 7.5 % — SIGNIFICANT CHANGE UP (ref 2–14)
NEUTROPHILS # BLD AUTO: 3.6 K/UL — SIGNIFICANT CHANGE UP (ref 1.8–7.4)
NEUTROPHILS NFR BLD AUTO: 71.6 % — SIGNIFICANT CHANGE UP (ref 43–77)
PLATELET # BLD AUTO: 186 K/UL — SIGNIFICANT CHANGE UP (ref 150–400)
RBC # BLD: 4.75 M/UL — SIGNIFICANT CHANGE UP (ref 4.2–5.8)
RBC # FLD: 11.5 % — SIGNIFICANT CHANGE UP (ref 10.3–14.5)
WBC # BLD: 5.1 K/UL — SIGNIFICANT CHANGE UP (ref 3.8–10.5)
WBC # FLD AUTO: 5.1 K/UL — SIGNIFICANT CHANGE UP (ref 3.8–10.5)

## 2018-05-14 ENCOUNTER — APPOINTMENT (OUTPATIENT)
Dept: HEMATOLOGY ONCOLOGY | Facility: CLINIC | Age: 63
End: 2018-05-14

## 2018-05-14 ENCOUNTER — RESULT REVIEW (OUTPATIENT)
Age: 63
End: 2018-05-14

## 2018-05-14 LAB
BASOPHILS # BLD AUTO: 0 K/UL — SIGNIFICANT CHANGE UP (ref 0–0.2)
BASOPHILS NFR BLD AUTO: 0 % — SIGNIFICANT CHANGE UP (ref 0–2)
EOSINOPHIL # BLD AUTO: 0.2 K/UL — SIGNIFICANT CHANGE UP (ref 0–0.5)
EOSINOPHIL NFR BLD AUTO: 3.5 % — SIGNIFICANT CHANGE UP (ref 0–6)
HCT VFR BLD CALC: 43.8 % — SIGNIFICANT CHANGE UP (ref 39–50)
HGB BLD-MCNC: 15.4 G/DL — SIGNIFICANT CHANGE UP (ref 13–17)
LYMPHOCYTES # BLD AUTO: 0.8 K/UL — LOW (ref 1–3.3)
LYMPHOCYTES # BLD AUTO: 14.2 % — SIGNIFICANT CHANGE UP (ref 13–44)
MCHC RBC-ENTMCNC: 33.8 PG — SIGNIFICANT CHANGE UP (ref 27–34)
MCHC RBC-ENTMCNC: 35.2 G/DL — SIGNIFICANT CHANGE UP (ref 32–36)
MCV RBC AUTO: 96.1 FL — SIGNIFICANT CHANGE UP (ref 80–100)
MONOCYTES # BLD AUTO: 0.4 K/UL — SIGNIFICANT CHANGE UP (ref 0–0.9)
MONOCYTES NFR BLD AUTO: 7.1 % — SIGNIFICANT CHANGE UP (ref 2–14)
NEUTROPHILS # BLD AUTO: 4.1 K/UL — SIGNIFICANT CHANGE UP (ref 1.8–7.4)
NEUTROPHILS NFR BLD AUTO: 75.2 % — SIGNIFICANT CHANGE UP (ref 43–77)
PLATELET # BLD AUTO: 177 K/UL — SIGNIFICANT CHANGE UP (ref 150–400)
RBC # BLD: 4.56 M/UL — SIGNIFICANT CHANGE UP (ref 4.2–5.8)
RBC # FLD: 11.6 % — SIGNIFICANT CHANGE UP (ref 10.3–14.5)
WBC # BLD: 5.4 K/UL — SIGNIFICANT CHANGE UP (ref 3.8–10.5)
WBC # FLD AUTO: 5.4 K/UL — SIGNIFICANT CHANGE UP (ref 3.8–10.5)

## 2018-05-21 ENCOUNTER — APPOINTMENT (OUTPATIENT)
Dept: HEMATOLOGY ONCOLOGY | Facility: CLINIC | Age: 63
End: 2018-05-21

## 2018-05-21 ENCOUNTER — RESULT REVIEW (OUTPATIENT)
Age: 63
End: 2018-05-21

## 2018-05-21 LAB
HCT VFR BLD CALC: 44.4 % — SIGNIFICANT CHANGE UP (ref 39–50)
HGB BLD-MCNC: 15.8 G/DL — SIGNIFICANT CHANGE UP (ref 13–17)
MCHC RBC-ENTMCNC: 33.9 PG — SIGNIFICANT CHANGE UP (ref 27–34)
MCHC RBC-ENTMCNC: 35.5 G/DL — SIGNIFICANT CHANGE UP (ref 32–36)
MCV RBC AUTO: 95.5 FL — SIGNIFICANT CHANGE UP (ref 80–100)
PLATELET # BLD AUTO: 225 K/UL — SIGNIFICANT CHANGE UP (ref 150–400)
RBC # BLD: 4.65 M/UL — SIGNIFICANT CHANGE UP (ref 4.2–5.8)
RBC # FLD: 11.5 % — SIGNIFICANT CHANGE UP (ref 10.3–14.5)
WBC # BLD: 5.4 K/UL — SIGNIFICANT CHANGE UP (ref 3.8–10.5)
WBC # FLD AUTO: 5.4 K/UL — SIGNIFICANT CHANGE UP (ref 3.8–10.5)

## 2018-05-22 LAB
BASOPHILS # BLD AUTO: 0 K/UL — SIGNIFICANT CHANGE UP (ref 0–0.2)
BASOPHILS NFR BLD AUTO: 0.3 % — SIGNIFICANT CHANGE UP (ref 0–2)
EOSINOPHIL # BLD AUTO: 0.2 K/UL — SIGNIFICANT CHANGE UP (ref 0–0.5)
EOSINOPHIL NFR BLD AUTO: 3 % — SIGNIFICANT CHANGE UP (ref 0–6)
LYMPHOCYTES # BLD AUTO: 1 K/UL — SIGNIFICANT CHANGE UP (ref 1–3.3)
LYMPHOCYTES # BLD AUTO: 17.9 % — SIGNIFICANT CHANGE UP (ref 13–44)
MONOCYTES # BLD AUTO: 0.5 K/UL — SIGNIFICANT CHANGE UP (ref 0–0.9)
MONOCYTES NFR BLD AUTO: 8.5 % — SIGNIFICANT CHANGE UP (ref 2–14)
NEUTROPHILS # BLD AUTO: 3.8 K/UL — SIGNIFICANT CHANGE UP (ref 1.8–7.4)
NEUTROPHILS NFR BLD AUTO: 70.3 % — SIGNIFICANT CHANGE UP (ref 43–77)

## 2018-05-24 ENCOUNTER — APPOINTMENT (OUTPATIENT)
Dept: MRI IMAGING | Facility: IMAGING CENTER | Age: 63
End: 2018-05-24
Payer: COMMERCIAL

## 2018-05-24 ENCOUNTER — APPOINTMENT (OUTPATIENT)
Dept: HEMATOLOGY ONCOLOGY | Facility: CLINIC | Age: 63
End: 2018-05-24

## 2018-05-24 ENCOUNTER — APPOINTMENT (OUTPATIENT)
Dept: RADIATION ONCOLOGY | Facility: CLINIC | Age: 63
End: 2018-05-24
Payer: COMMERCIAL

## 2018-05-24 ENCOUNTER — APPOINTMENT (OUTPATIENT)
Dept: NEUROLOGY | Facility: CLINIC | Age: 63
End: 2018-05-24
Payer: COMMERCIAL

## 2018-05-24 ENCOUNTER — OUTPATIENT (OUTPATIENT)
Dept: OUTPATIENT SERVICES | Facility: HOSPITAL | Age: 63
LOS: 1 days | End: 2018-05-24
Payer: COMMERCIAL

## 2018-05-24 VITALS
DIASTOLIC BLOOD PRESSURE: 77 MMHG | OXYGEN SATURATION: 99 % | SYSTOLIC BLOOD PRESSURE: 115 MMHG | TEMPERATURE: 97.52 F | WEIGHT: 154.43 LBS | BODY MASS INDEX: 24.19 KG/M2 | HEART RATE: 72 BPM | RESPIRATION RATE: 15 BRPM

## 2018-05-24 VITALS
RESPIRATION RATE: 16 BRPM | TEMPERATURE: 208.4 F | WEIGHT: 154 LBS | OXYGEN SATURATION: 98 % | DIASTOLIC BLOOD PRESSURE: 80 MMHG | HEIGHT: 67 IN | BODY MASS INDEX: 24.17 KG/M2 | HEART RATE: 72 BPM | SYSTOLIC BLOOD PRESSURE: 124 MMHG

## 2018-05-24 DIAGNOSIS — Z00.8 ENCOUNTER FOR OTHER GENERAL EXAMINATION: ICD-10-CM

## 2018-05-24 DIAGNOSIS — G93.9 DISORDER OF BRAIN, UNSPECIFIED: Chronic | ICD-10-CM

## 2018-05-24 DIAGNOSIS — C71.9 MALIGNANT NEOPLASM OF BRAIN, UNSPECIFIED: ICD-10-CM

## 2018-05-24 LAB
ALBUMIN SERPL ELPH-MCNC: 4.3 G/DL
ALP BLD-CCNC: 69 U/L
ALT SERPL-CCNC: 15 U/L
ANION GAP SERPL CALC-SCNC: 11 MMOL/L
AST SERPL-CCNC: 12 U/L
BILIRUB SERPL-MCNC: 0.4 MG/DL
BUN SERPL-MCNC: 16 MG/DL
CALCIUM SERPL-MCNC: 9.4 MG/DL
CHLORIDE SERPL-SCNC: 102 MMOL/L
CO2 SERPL-SCNC: 28 MMOL/L
CREAT SERPL-MCNC: 0.94 MG/DL
GLUCOSE SERPL-MCNC: 150 MG/DL
POTASSIUM SERPL-SCNC: 4.7 MMOL/L
PROT SERPL-MCNC: 6.9 G/DL
SODIUM SERPL-SCNC: 141 MMOL/L

## 2018-05-24 PROCEDURE — A9585: CPT

## 2018-05-24 PROCEDURE — 99024 POSTOP FOLLOW-UP VISIT: CPT | Mod: GC

## 2018-05-24 PROCEDURE — 70553 MRI BRAIN STEM W/O & W/DYE: CPT

## 2018-05-24 PROCEDURE — 99214 OFFICE O/P EST MOD 30 MIN: CPT

## 2018-05-24 PROCEDURE — 70553 MRI BRAIN STEM W/O & W/DYE: CPT | Mod: 26

## 2018-05-24 RX ORDER — TEMOZOLOMIDE 20 MG/1
20 CAPSULE ORAL AT BEDTIME
Qty: 5 | Refills: 0 | Status: DISCONTINUED | COMMUNITY
Start: 2018-04-26 | End: 2018-05-24

## 2018-05-24 RX ORDER — PEN NEEDLE, DIABETIC 29 G X1/2"
32G X 4 MM NEEDLE, DISPOSABLE MISCELLANEOUS
Qty: 100 | Refills: 0 | Status: DISCONTINUED | COMMUNITY
Start: 2018-01-11

## 2018-05-24 RX ORDER — TEMOZOLOMIDE 250 MG/1
250 CAPSULE ORAL AT BEDTIME
Qty: 5 | Refills: 0 | Status: DISCONTINUED | COMMUNITY
Start: 2018-04-26 | End: 2018-05-24

## 2018-05-24 RX ORDER — PHENYTOIN SODIUM 100 MG/1
100 CAPSULE ORAL
Qty: 150 | Refills: 0 | Status: DISCONTINUED | COMMUNITY
Start: 2017-10-23

## 2018-05-24 RX ORDER — METFORMIN ER 500 MG 500 MG/1
500 TABLET ORAL
Qty: 90 | Refills: 0 | Status: DISCONTINUED | COMMUNITY
Start: 2018-04-09

## 2018-05-24 RX ORDER — INSULIN LISPRO 100 [IU]/ML
100 INJECTION, SOLUTION INTRAVENOUS; SUBCUTANEOUS
Qty: 6 | Refills: 0 | Status: DISCONTINUED | COMMUNITY
Start: 2018-01-11

## 2018-06-04 ENCOUNTER — APPOINTMENT (OUTPATIENT)
Dept: HEMATOLOGY ONCOLOGY | Facility: CLINIC | Age: 63
End: 2018-06-04

## 2018-06-04 ENCOUNTER — RESULT REVIEW (OUTPATIENT)
Age: 63
End: 2018-06-04

## 2018-06-04 LAB
HCT VFR BLD CALC: 45.5 % — SIGNIFICANT CHANGE UP (ref 39–50)
HGB BLD-MCNC: 15.8 G/DL — SIGNIFICANT CHANGE UP (ref 13–17)
MCHC RBC-ENTMCNC: 33.3 PG — SIGNIFICANT CHANGE UP (ref 27–34)
MCHC RBC-ENTMCNC: 34.7 G/DL — SIGNIFICANT CHANGE UP (ref 32–36)
MCV RBC AUTO: 96 FL — SIGNIFICANT CHANGE UP (ref 80–100)
PLATELET # BLD AUTO: 190 K/UL — SIGNIFICANT CHANGE UP (ref 150–400)
RBC # BLD: 4.74 M/UL — SIGNIFICANT CHANGE UP (ref 4.2–5.8)
RBC # FLD: 11.7 % — SIGNIFICANT CHANGE UP (ref 10.3–14.5)
WBC # BLD: 5.8 K/UL — SIGNIFICANT CHANGE UP (ref 3.8–10.5)
WBC # FLD AUTO: 5.8 K/UL — SIGNIFICANT CHANGE UP (ref 3.8–10.5)

## 2018-06-07 ENCOUNTER — OUTPATIENT (OUTPATIENT)
Dept: OUTPATIENT SERVICES | Facility: HOSPITAL | Age: 63
LOS: 1 days | Discharge: HOME | End: 2018-06-07

## 2018-06-07 DIAGNOSIS — G93.9 DISORDER OF BRAIN, UNSPECIFIED: Chronic | ICD-10-CM

## 2018-06-07 DIAGNOSIS — D64.9 ANEMIA, UNSPECIFIED: ICD-10-CM

## 2018-06-11 ENCOUNTER — RESULT REVIEW (OUTPATIENT)
Age: 63
End: 2018-06-11

## 2018-06-11 ENCOUNTER — APPOINTMENT (OUTPATIENT)
Dept: HEMATOLOGY ONCOLOGY | Facility: CLINIC | Age: 63
End: 2018-06-11

## 2018-06-11 LAB
BASOPHILS # BLD AUTO: 0 K/UL — SIGNIFICANT CHANGE UP (ref 0–0.2)
BASOPHILS NFR BLD AUTO: 0.4 % — SIGNIFICANT CHANGE UP (ref 0–2)
EOSINOPHIL # BLD AUTO: 0.1 K/UL — SIGNIFICANT CHANGE UP (ref 0–0.5)
EOSINOPHIL NFR BLD AUTO: 2 % — SIGNIFICANT CHANGE UP (ref 0–6)
HCT VFR BLD CALC: 44.9 % — SIGNIFICANT CHANGE UP (ref 39–50)
HGB BLD-MCNC: 15.4 G/DL — SIGNIFICANT CHANGE UP (ref 13–17)
LYMPHOCYTES # BLD AUTO: 0.9 K/UL — LOW (ref 1–3.3)
LYMPHOCYTES # BLD AUTO: 15.3 % — SIGNIFICANT CHANGE UP (ref 13–44)
MCHC RBC-ENTMCNC: 33.2 PG — SIGNIFICANT CHANGE UP (ref 27–34)
MCHC RBC-ENTMCNC: 34.3 G/DL — SIGNIFICANT CHANGE UP (ref 32–36)
MCV RBC AUTO: 96.7 FL — SIGNIFICANT CHANGE UP (ref 80–100)
MONOCYTES # BLD AUTO: 0.4 K/UL — SIGNIFICANT CHANGE UP (ref 0–0.9)
MONOCYTES NFR BLD AUTO: 7.4 % — SIGNIFICANT CHANGE UP (ref 2–14)
NEUTROPHILS # BLD AUTO: 4.2 K/UL — SIGNIFICANT CHANGE UP (ref 1.8–7.4)
NEUTROPHILS NFR BLD AUTO: 74.9 % — SIGNIFICANT CHANGE UP (ref 43–77)
PLATELET # BLD AUTO: 185 K/UL — SIGNIFICANT CHANGE UP (ref 150–400)
RBC # BLD: 4.64 M/UL — SIGNIFICANT CHANGE UP (ref 4.2–5.8)
RBC # FLD: 11.7 % — SIGNIFICANT CHANGE UP (ref 10.3–14.5)
WBC # BLD: 5.6 K/UL — SIGNIFICANT CHANGE UP (ref 3.8–10.5)
WBC # FLD AUTO: 5.6 K/UL — SIGNIFICANT CHANGE UP (ref 3.8–10.5)

## 2018-06-18 ENCOUNTER — OTHER (OUTPATIENT)
Age: 63
End: 2018-06-18

## 2018-06-19 ENCOUNTER — APPOINTMENT (OUTPATIENT)
Dept: HEMATOLOGY ONCOLOGY | Facility: CLINIC | Age: 63
End: 2018-06-19

## 2018-06-19 ENCOUNTER — APPOINTMENT (OUTPATIENT)
Dept: NEUROLOGY | Facility: CLINIC | Age: 63
End: 2018-06-19
Payer: COMMERCIAL

## 2018-06-19 ENCOUNTER — RESULT REVIEW (OUTPATIENT)
Age: 63
End: 2018-06-19

## 2018-06-19 VITALS
WEIGHT: 155 LBS | HEIGHT: 67 IN | HEART RATE: 80 BPM | DIASTOLIC BLOOD PRESSURE: 77 MMHG | SYSTOLIC BLOOD PRESSURE: 129 MMHG | OXYGEN SATURATION: 98 % | TEMPERATURE: 208.76 F | BODY MASS INDEX: 24.33 KG/M2 | RESPIRATION RATE: 18 BRPM

## 2018-06-19 LAB
ALBUMIN SERPL ELPH-MCNC: 4.2 G/DL
ALP BLD-CCNC: 67 U/L
ALT SERPL-CCNC: 15 U/L
ANION GAP SERPL CALC-SCNC: 14 MMOL/L
AST SERPL-CCNC: 10 U/L
BASOPHILS # BLD AUTO: 0 K/UL — SIGNIFICANT CHANGE UP (ref 0–0.2)
BASOPHILS NFR BLD AUTO: 0.1 % — SIGNIFICANT CHANGE UP (ref 0–2)
BILIRUB SERPL-MCNC: 0.3 MG/DL
BUN SERPL-MCNC: 13 MG/DL
CALCIUM SERPL-MCNC: 9.4 MG/DL
CHLORIDE SERPL-SCNC: 103 MMOL/L
CO2 SERPL-SCNC: 27 MMOL/L
CREAT SERPL-MCNC: 1.03 MG/DL
EOSINOPHIL # BLD AUTO: 0.1 K/UL — SIGNIFICANT CHANGE UP (ref 0–0.5)
EOSINOPHIL NFR BLD AUTO: 1.6 % — SIGNIFICANT CHANGE UP (ref 0–6)
GLUCOSE SERPL-MCNC: 195 MG/DL
HCT VFR BLD CALC: 43.6 % — SIGNIFICANT CHANGE UP (ref 39–50)
HGB BLD-MCNC: 15.4 G/DL — SIGNIFICANT CHANGE UP (ref 13–17)
LYMPHOCYTES # BLD AUTO: 0.9 K/UL — LOW (ref 1–3.3)
LYMPHOCYTES # BLD AUTO: 18.9 % — SIGNIFICANT CHANGE UP (ref 13–44)
MCHC RBC-ENTMCNC: 34.2 PG — HIGH (ref 27–34)
MCHC RBC-ENTMCNC: 35.3 G/DL — SIGNIFICANT CHANGE UP (ref 32–36)
MCV RBC AUTO: 96.9 FL — SIGNIFICANT CHANGE UP (ref 80–100)
MONOCYTES # BLD AUTO: 0.3 K/UL — SIGNIFICANT CHANGE UP (ref 0–0.9)
MONOCYTES NFR BLD AUTO: 6.7 % — SIGNIFICANT CHANGE UP (ref 2–14)
NEUTROPHILS # BLD AUTO: 3.3 K/UL — SIGNIFICANT CHANGE UP (ref 1.8–7.4)
NEUTROPHILS NFR BLD AUTO: 72.7 % — SIGNIFICANT CHANGE UP (ref 43–77)
PLATELET # BLD AUTO: 167 K/UL — SIGNIFICANT CHANGE UP (ref 150–400)
POTASSIUM SERPL-SCNC: 4.6 MMOL/L
PROT SERPL-MCNC: 6.6 G/DL
RBC # BLD: 4.5 M/UL — SIGNIFICANT CHANGE UP (ref 4.2–5.8)
RBC # FLD: 11.7 % — SIGNIFICANT CHANGE UP (ref 10.3–14.5)
SODIUM SERPL-SCNC: 144 MMOL/L
WBC # BLD: 4.5 K/UL — SIGNIFICANT CHANGE UP (ref 3.8–10.5)
WBC # FLD AUTO: 4.5 K/UL — SIGNIFICANT CHANGE UP (ref 3.8–10.5)

## 2018-06-19 PROCEDURE — 99214 OFFICE O/P EST MOD 30 MIN: CPT

## 2018-06-19 RX ORDER — TEMOZOLOMIDE 180 MG/1
180 CAPSULE ORAL AT BEDTIME
Qty: 10 | Refills: 0 | Status: DISCONTINUED | COMMUNITY
Start: 2018-05-24 | End: 2018-06-19

## 2018-07-03 ENCOUNTER — RESULT REVIEW (OUTPATIENT)
Age: 63
End: 2018-07-03

## 2018-07-03 ENCOUNTER — APPOINTMENT (OUTPATIENT)
Dept: HEMATOLOGY ONCOLOGY | Facility: CLINIC | Age: 63
End: 2018-07-03

## 2018-07-03 LAB
BASOPHILS # BLD AUTO: 0 K/UL — SIGNIFICANT CHANGE UP (ref 0–0.2)
BASOPHILS NFR BLD AUTO: 0.2 % — SIGNIFICANT CHANGE UP (ref 0–2)
EOSINOPHIL # BLD AUTO: 0.1 K/UL — SIGNIFICANT CHANGE UP (ref 0–0.5)
EOSINOPHIL NFR BLD AUTO: 3 % — SIGNIFICANT CHANGE UP (ref 0–6)
HCT VFR BLD CALC: 43.8 % — SIGNIFICANT CHANGE UP (ref 39–50)
HGB BLD-MCNC: 15 G/DL — SIGNIFICANT CHANGE UP (ref 13–17)
LYMPHOCYTES # BLD AUTO: 0.8 K/UL — LOW (ref 1–3.3)
LYMPHOCYTES # BLD AUTO: 16.1 % — SIGNIFICANT CHANGE UP (ref 13–44)
MCHC RBC-ENTMCNC: 33.2 PG — SIGNIFICANT CHANGE UP (ref 27–34)
MCHC RBC-ENTMCNC: 34.3 G/DL — SIGNIFICANT CHANGE UP (ref 32–36)
MCV RBC AUTO: 97 FL — SIGNIFICANT CHANGE UP (ref 80–100)
MONOCYTES # BLD AUTO: 0.4 K/UL — SIGNIFICANT CHANGE UP (ref 0–0.9)
MONOCYTES NFR BLD AUTO: 8.3 % — SIGNIFICANT CHANGE UP (ref 2–14)
NEUTROPHILS # BLD AUTO: 3.5 K/UL — SIGNIFICANT CHANGE UP (ref 1.8–7.4)
NEUTROPHILS NFR BLD AUTO: 72.5 % — SIGNIFICANT CHANGE UP (ref 43–77)
PLATELET # BLD AUTO: 181 K/UL — SIGNIFICANT CHANGE UP (ref 150–400)
RBC # BLD: 4.52 M/UL — SIGNIFICANT CHANGE UP (ref 4.2–5.8)
RBC # FLD: 12.1 % — SIGNIFICANT CHANGE UP (ref 10.3–14.5)
WBC # BLD: 4.8 K/UL — SIGNIFICANT CHANGE UP (ref 3.8–10.5)
WBC # FLD AUTO: 4.8 K/UL — SIGNIFICANT CHANGE UP (ref 3.8–10.5)

## 2018-07-05 ENCOUNTER — OUTPATIENT (OUTPATIENT)
Dept: OUTPATIENT SERVICES | Facility: HOSPITAL | Age: 63
LOS: 1 days | Discharge: HOME | End: 2018-07-05

## 2018-07-05 DIAGNOSIS — D64.9 ANEMIA, UNSPECIFIED: ICD-10-CM

## 2018-07-05 DIAGNOSIS — G93.9 DISORDER OF BRAIN, UNSPECIFIED: Chronic | ICD-10-CM

## 2018-07-06 ENCOUNTER — RX RENEWAL (OUTPATIENT)
Age: 63
End: 2018-07-06

## 2018-07-10 ENCOUNTER — RESULT REVIEW (OUTPATIENT)
Age: 63
End: 2018-07-10

## 2018-07-10 ENCOUNTER — APPOINTMENT (OUTPATIENT)
Dept: HEMATOLOGY ONCOLOGY | Facility: CLINIC | Age: 63
End: 2018-07-10

## 2018-07-10 LAB
HCT VFR BLD CALC: 43.6 % — SIGNIFICANT CHANGE UP (ref 39–50)
HGB BLD-MCNC: 15.4 G/DL — SIGNIFICANT CHANGE UP (ref 13–17)
MCHC RBC-ENTMCNC: 34.2 PG — HIGH (ref 27–34)
MCHC RBC-ENTMCNC: 35.4 G/DL — SIGNIFICANT CHANGE UP (ref 32–36)
MCV RBC AUTO: 96.7 FL — SIGNIFICANT CHANGE UP (ref 80–100)
PLATELET # BLD AUTO: 186 K/UL — SIGNIFICANT CHANGE UP (ref 150–400)
RBC # BLD: 4.51 M/UL — SIGNIFICANT CHANGE UP (ref 4.2–5.8)
RBC # FLD: 12.1 % — SIGNIFICANT CHANGE UP (ref 10.3–14.5)
WBC # BLD: 4.9 K/UL — SIGNIFICANT CHANGE UP (ref 3.8–10.5)
WBC # FLD AUTO: 4.9 K/UL — SIGNIFICANT CHANGE UP (ref 3.8–10.5)

## 2018-07-11 ENCOUNTER — RX RENEWAL (OUTPATIENT)
Age: 63
End: 2018-07-11

## 2018-07-17 ENCOUNTER — APPOINTMENT (OUTPATIENT)
Dept: NEUROLOGY | Facility: CLINIC | Age: 63
End: 2018-07-17

## 2018-07-17 ENCOUNTER — OUTPATIENT (OUTPATIENT)
Dept: OUTPATIENT SERVICES | Facility: HOSPITAL | Age: 63
LOS: 1 days | End: 2018-07-17
Payer: COMMERCIAL

## 2018-07-17 ENCOUNTER — APPOINTMENT (OUTPATIENT)
Dept: NEUROLOGY | Facility: CLINIC | Age: 63
End: 2018-07-17
Payer: COMMERCIAL

## 2018-07-17 ENCOUNTER — APPOINTMENT (OUTPATIENT)
Dept: MRI IMAGING | Facility: IMAGING CENTER | Age: 63
End: 2018-07-17
Payer: COMMERCIAL

## 2018-07-17 ENCOUNTER — APPOINTMENT (OUTPATIENT)
Dept: HEMATOLOGY ONCOLOGY | Facility: CLINIC | Age: 63
End: 2018-07-17

## 2018-07-17 VITALS
RESPIRATION RATE: 18 BRPM | HEART RATE: 76 BPM | DIASTOLIC BLOOD PRESSURE: 70 MMHG | OXYGEN SATURATION: 98 % | TEMPERATURE: 97.9 F | BODY MASS INDEX: 24.33 KG/M2 | SYSTOLIC BLOOD PRESSURE: 113 MMHG | HEIGHT: 67 IN | WEIGHT: 155 LBS

## 2018-07-17 DIAGNOSIS — G93.9 DISORDER OF BRAIN, UNSPECIFIED: Chronic | ICD-10-CM

## 2018-07-17 DIAGNOSIS — Z00.8 ENCOUNTER FOR OTHER GENERAL EXAMINATION: ICD-10-CM

## 2018-07-17 PROBLEM — R56.9 UNSPECIFIED CONVULSIONS: Chronic | Status: ACTIVE | Noted: 2018-01-04

## 2018-07-17 PROBLEM — R22.9 LOCALIZED SWELLING, MASS AND LUMP, UNSPECIFIED: Chronic | Status: ACTIVE | Noted: 2018-01-04

## 2018-07-17 PROBLEM — D49.6 NEOPLASM OF UNSPECIFIED BEHAVIOR OF BRAIN: Chronic | Status: ACTIVE | Noted: 2018-01-04

## 2018-07-17 PROCEDURE — A9585: CPT

## 2018-07-17 PROCEDURE — 99214 OFFICE O/P EST MOD 30 MIN: CPT

## 2018-07-17 PROCEDURE — 70553 MRI BRAIN STEM W/O & W/DYE: CPT | Mod: 26

## 2018-07-17 PROCEDURE — 70553 MRI BRAIN STEM W/O & W/DYE: CPT

## 2018-07-18 ENCOUNTER — RESULT REVIEW (OUTPATIENT)
Age: 63
End: 2018-07-18

## 2018-07-18 ENCOUNTER — APPOINTMENT (OUTPATIENT)
Dept: HEMATOLOGY ONCOLOGY | Facility: CLINIC | Age: 63
End: 2018-07-18

## 2018-07-18 LAB
HCT VFR BLD CALC: 46.1 % — SIGNIFICANT CHANGE UP (ref 39–50)
HGB BLD-MCNC: 15.4 G/DL — SIGNIFICANT CHANGE UP (ref 13–17)
MCHC RBC-ENTMCNC: 32.7 PG — SIGNIFICANT CHANGE UP (ref 27–34)
MCHC RBC-ENTMCNC: 33.5 G/DL — SIGNIFICANT CHANGE UP (ref 32–36)
MCV RBC AUTO: 97.7 FL — SIGNIFICANT CHANGE UP (ref 80–100)
PLATELET # BLD AUTO: 177 K/UL — SIGNIFICANT CHANGE UP (ref 150–400)
RBC # BLD: 4.72 M/UL — SIGNIFICANT CHANGE UP (ref 4.2–5.8)
RBC # FLD: 12 % — SIGNIFICANT CHANGE UP (ref 10.3–14.5)
WBC # BLD: 4.5 K/UL — SIGNIFICANT CHANGE UP (ref 3.8–10.5)
WBC # FLD AUTO: 4.5 K/UL — SIGNIFICANT CHANGE UP (ref 3.8–10.5)

## 2018-07-19 LAB
BASOPHILS # BLD AUTO: 0 K/UL — SIGNIFICANT CHANGE UP (ref 0–0.2)
BASOPHILS NFR BLD AUTO: 0.8 % — SIGNIFICANT CHANGE UP (ref 0–2)
EOSINOPHIL # BLD AUTO: 0.1 K/UL — SIGNIFICANT CHANGE UP (ref 0–0.5)
EOSINOPHIL NFR BLD AUTO: 2.6 % — SIGNIFICANT CHANGE UP (ref 0–6)
LYMPHOCYTES # BLD AUTO: 0.8 K/UL — LOW (ref 1–3.3)
LYMPHOCYTES # BLD AUTO: 18 % — SIGNIFICANT CHANGE UP (ref 13–44)
MONOCYTES # BLD AUTO: 0.4 K/UL — SIGNIFICANT CHANGE UP (ref 0–0.9)
MONOCYTES NFR BLD AUTO: 8.7 % — SIGNIFICANT CHANGE UP (ref 2–14)
NEUTROPHILS # BLD AUTO: 3.3 K/UL — SIGNIFICANT CHANGE UP (ref 1.8–7.4)
NEUTROPHILS NFR BLD AUTO: 69.9 % — SIGNIFICANT CHANGE UP (ref 43–77)

## 2018-07-20 LAB
ALBUMIN SERPL ELPH-MCNC: 4.4 G/DL
ALP BLD-CCNC: 67 U/L
ALT SERPL-CCNC: 11 U/L
ANION GAP SERPL CALC-SCNC: 15 MMOL/L
AST SERPL-CCNC: 12 U/L
BILIRUB SERPL-MCNC: 0.5 MG/DL
BUN SERPL-MCNC: 11 MG/DL
CALCIUM SERPL-MCNC: 9.4 MG/DL
CHLORIDE SERPL-SCNC: 102 MMOL/L
CO2 SERPL-SCNC: 24 MMOL/L
CREAT SERPL-MCNC: 0.98 MG/DL
GLUCOSE SERPL-MCNC: 152 MG/DL
POTASSIUM SERPL-SCNC: 4.3 MMOL/L
PROT SERPL-MCNC: 6.7 G/DL
SODIUM SERPL-SCNC: 141 MMOL/L

## 2018-07-25 ENCOUNTER — RX RENEWAL (OUTPATIENT)
Age: 63
End: 2018-07-25

## 2018-07-31 ENCOUNTER — RESULT REVIEW (OUTPATIENT)
Age: 63
End: 2018-07-31

## 2018-07-31 ENCOUNTER — APPOINTMENT (OUTPATIENT)
Dept: HEMATOLOGY ONCOLOGY | Facility: CLINIC | Age: 63
End: 2018-07-31

## 2018-07-31 LAB
BASOPHILS # BLD AUTO: 0 K/UL — SIGNIFICANT CHANGE UP (ref 0–0.2)
BASOPHILS NFR BLD AUTO: 0 % — SIGNIFICANT CHANGE UP (ref 0–2)
EOSINOPHIL # BLD AUTO: 0.1 K/UL — SIGNIFICANT CHANGE UP (ref 0–0.5)
EOSINOPHIL NFR BLD AUTO: 2.8 % — SIGNIFICANT CHANGE UP (ref 0–6)
HCT VFR BLD CALC: 45.2 % — SIGNIFICANT CHANGE UP (ref 39–50)
HGB BLD-MCNC: 15.2 G/DL — SIGNIFICANT CHANGE UP (ref 13–17)
LYMPHOCYTES # BLD AUTO: 0.6 K/UL — LOW (ref 1–3.3)
LYMPHOCYTES # BLD AUTO: 13.5 % — SIGNIFICANT CHANGE UP (ref 13–44)
MCHC RBC-ENTMCNC: 32.6 PG — SIGNIFICANT CHANGE UP (ref 27–34)
MCHC RBC-ENTMCNC: 33.6 G/DL — SIGNIFICANT CHANGE UP (ref 32–36)
MCV RBC AUTO: 96.9 FL — SIGNIFICANT CHANGE UP (ref 80–100)
MONOCYTES # BLD AUTO: 0.3 K/UL — SIGNIFICANT CHANGE UP (ref 0–0.9)
MONOCYTES NFR BLD AUTO: 7.2 % — SIGNIFICANT CHANGE UP (ref 2–14)
NEUTROPHILS # BLD AUTO: 3.4 K/UL — SIGNIFICANT CHANGE UP (ref 1.8–7.4)
NEUTROPHILS NFR BLD AUTO: 76.5 % — SIGNIFICANT CHANGE UP (ref 43–77)
PLATELET # BLD AUTO: 174 K/UL — SIGNIFICANT CHANGE UP (ref 150–400)
RBC # BLD: 4.67 M/UL — SIGNIFICANT CHANGE UP (ref 4.2–5.8)
RBC # FLD: 12.2 % — SIGNIFICANT CHANGE UP (ref 10.3–14.5)
WBC # BLD: 4.5 K/UL — SIGNIFICANT CHANGE UP (ref 3.8–10.5)
WBC # FLD AUTO: 4.5 K/UL — SIGNIFICANT CHANGE UP (ref 3.8–10.5)

## 2018-08-01 ENCOUNTER — OUTPATIENT (OUTPATIENT)
Dept: OUTPATIENT SERVICES | Facility: HOSPITAL | Age: 63
LOS: 1 days | Discharge: HOME | End: 2018-08-01

## 2018-08-01 DIAGNOSIS — G93.9 DISORDER OF BRAIN, UNSPECIFIED: Chronic | ICD-10-CM

## 2018-08-01 DIAGNOSIS — D64.9 ANEMIA, UNSPECIFIED: ICD-10-CM

## 2018-08-07 ENCOUNTER — APPOINTMENT (OUTPATIENT)
Dept: HEMATOLOGY ONCOLOGY | Facility: CLINIC | Age: 63
End: 2018-08-07

## 2018-08-14 ENCOUNTER — APPOINTMENT (OUTPATIENT)
Dept: HEMATOLOGY ONCOLOGY | Facility: CLINIC | Age: 63
End: 2018-08-14

## 2018-08-16 ENCOUNTER — RESULT REVIEW (OUTPATIENT)
Age: 63
End: 2018-08-16

## 2018-08-16 ENCOUNTER — APPOINTMENT (OUTPATIENT)
Dept: HEMATOLOGY ONCOLOGY | Facility: CLINIC | Age: 63
End: 2018-08-16

## 2018-08-16 ENCOUNTER — APPOINTMENT (OUTPATIENT)
Dept: NEUROLOGY | Facility: CLINIC | Age: 63
End: 2018-08-16
Payer: COMMERCIAL

## 2018-08-16 VITALS
BODY MASS INDEX: 24.17 KG/M2 | HEART RATE: 76 BPM | DIASTOLIC BLOOD PRESSURE: 78 MMHG | WEIGHT: 154 LBS | RESPIRATION RATE: 18 BRPM | SYSTOLIC BLOOD PRESSURE: 115 MMHG | OXYGEN SATURATION: 98 % | TEMPERATURE: 98 F | HEIGHT: 67 IN

## 2018-08-16 LAB
ALBUMIN SERPL ELPH-MCNC: 4.2 G/DL
ALP BLD-CCNC: 67 U/L
ALT SERPL-CCNC: 14 U/L
ANION GAP SERPL CALC-SCNC: 14 MMOL/L
AST SERPL-CCNC: 12 U/L
BASOPHILS # BLD AUTO: 0 K/UL — SIGNIFICANT CHANGE UP (ref 0–0.2)
BASOPHILS NFR BLD AUTO: 0.9 % — SIGNIFICANT CHANGE UP (ref 0–2)
BILIRUB SERPL-MCNC: 0.4 MG/DL
BUN SERPL-MCNC: 16 MG/DL
CALCIUM SERPL-MCNC: 9 MG/DL
CHLORIDE SERPL-SCNC: 100 MMOL/L
CO2 SERPL-SCNC: 27 MMOL/L
CREAT SERPL-MCNC: 0.97 MG/DL
EOSINOPHIL # BLD AUTO: 0.1 K/UL — SIGNIFICANT CHANGE UP (ref 0–0.5)
EOSINOPHIL NFR BLD AUTO: 2.6 % — SIGNIFICANT CHANGE UP (ref 0–6)
GLUCOSE SERPL-MCNC: 237 MG/DL
HCT VFR BLD CALC: 44.1 % — SIGNIFICANT CHANGE UP (ref 39–50)
HGB BLD-MCNC: 14.7 G/DL — SIGNIFICANT CHANGE UP (ref 13–17)
LYMPHOCYTES # BLD AUTO: 0.7 K/UL — LOW (ref 1–3.3)
LYMPHOCYTES # BLD AUTO: 19.8 % — SIGNIFICANT CHANGE UP (ref 13–44)
MCHC RBC-ENTMCNC: 32.7 PG — SIGNIFICANT CHANGE UP (ref 27–34)
MCHC RBC-ENTMCNC: 33.2 G/DL — SIGNIFICANT CHANGE UP (ref 32–36)
MCV RBC AUTO: 98.4 FL — SIGNIFICANT CHANGE UP (ref 80–100)
MONOCYTES # BLD AUTO: 0.3 K/UL — SIGNIFICANT CHANGE UP (ref 0–0.9)
MONOCYTES NFR BLD AUTO: 7.4 % — SIGNIFICANT CHANGE UP (ref 2–14)
NEUTROPHILS # BLD AUTO: 2.6 K/UL — SIGNIFICANT CHANGE UP (ref 1.8–7.4)
NEUTROPHILS NFR BLD AUTO: 69.3 % — SIGNIFICANT CHANGE UP (ref 43–77)
PLATELET # BLD AUTO: 153 K/UL — SIGNIFICANT CHANGE UP (ref 150–400)
POTASSIUM SERPL-SCNC: 4.5 MMOL/L
PROT SERPL-MCNC: 6.3 G/DL
RBC # BLD: 4.48 M/UL — SIGNIFICANT CHANGE UP (ref 4.2–5.8)
RBC # FLD: 12 % — SIGNIFICANT CHANGE UP (ref 10.3–14.5)
SODIUM SERPL-SCNC: 141 MMOL/L
WBC # BLD: 3.7 K/UL — LOW (ref 3.8–10.5)
WBC # FLD AUTO: 3.7 K/UL — LOW (ref 3.8–10.5)

## 2018-08-16 PROCEDURE — 99214 OFFICE O/P EST MOD 30 MIN: CPT

## 2018-08-21 ENCOUNTER — APPOINTMENT (OUTPATIENT)
Dept: HEMATOLOGY ONCOLOGY | Facility: CLINIC | Age: 63
End: 2018-08-21

## 2018-08-28 ENCOUNTER — RESULT REVIEW (OUTPATIENT)
Age: 63
End: 2018-08-28

## 2018-08-28 ENCOUNTER — APPOINTMENT (OUTPATIENT)
Dept: HEMATOLOGY ONCOLOGY | Facility: CLINIC | Age: 63
End: 2018-08-28

## 2018-08-28 LAB
HCT VFR BLD CALC: 43.6 % — SIGNIFICANT CHANGE UP (ref 39–50)
HGB BLD-MCNC: 14.9 G/DL — SIGNIFICANT CHANGE UP (ref 13–17)
MCHC RBC-ENTMCNC: 33.5 PG — SIGNIFICANT CHANGE UP (ref 27–34)
MCHC RBC-ENTMCNC: 34.1 G/DL — SIGNIFICANT CHANGE UP (ref 32–36)
MCV RBC AUTO: 98.3 FL — SIGNIFICANT CHANGE UP (ref 80–100)
PLATELET # BLD AUTO: 170 K/UL — SIGNIFICANT CHANGE UP (ref 150–400)
RBC # BLD: 4.44 M/UL — SIGNIFICANT CHANGE UP (ref 4.2–5.8)
RBC # FLD: 12.1 % — SIGNIFICANT CHANGE UP (ref 10.3–14.5)
WBC # BLD: 4.3 K/UL — SIGNIFICANT CHANGE UP (ref 3.8–10.5)
WBC # FLD AUTO: 4.3 K/UL — SIGNIFICANT CHANGE UP (ref 3.8–10.5)

## 2018-08-29 LAB
BASOPHILS # BLD AUTO: 0 K/UL — SIGNIFICANT CHANGE UP (ref 0–0.2)
BASOPHILS NFR BLD AUTO: 0.3 % — SIGNIFICANT CHANGE UP (ref 0–2)
EOSINOPHIL # BLD AUTO: 0.1 K/UL — SIGNIFICANT CHANGE UP (ref 0–0.5)
EOSINOPHIL NFR BLD AUTO: 3.2 % — SIGNIFICANT CHANGE UP (ref 0–6)
LYMPHOCYTES # BLD AUTO: 0.8 K/UL — LOW (ref 1–3.3)
LYMPHOCYTES # BLD AUTO: 18.6 % — SIGNIFICANT CHANGE UP (ref 13–44)
MONOCYTES # BLD AUTO: 0.3 K/UL — SIGNIFICANT CHANGE UP (ref 0–0.9)
MONOCYTES NFR BLD AUTO: 7.9 % — SIGNIFICANT CHANGE UP (ref 2–14)
NEUTROPHILS # BLD AUTO: 3 K/UL — SIGNIFICANT CHANGE UP (ref 1.8–7.4)
NEUTROPHILS NFR BLD AUTO: 69.8 % — SIGNIFICANT CHANGE UP (ref 43–77)

## 2018-09-04 ENCOUNTER — RX RENEWAL (OUTPATIENT)
Age: 63
End: 2018-09-04

## 2018-09-05 ENCOUNTER — RESULT REVIEW (OUTPATIENT)
Age: 63
End: 2018-09-05

## 2018-09-05 ENCOUNTER — APPOINTMENT (OUTPATIENT)
Dept: HEMATOLOGY ONCOLOGY | Facility: CLINIC | Age: 63
End: 2018-09-05

## 2018-09-05 LAB
BASOPHILS # BLD AUTO: 0 K/UL — SIGNIFICANT CHANGE UP (ref 0–0.2)
BASOPHILS NFR BLD AUTO: 0.2 % — SIGNIFICANT CHANGE UP (ref 0–2)
EOSINOPHIL # BLD AUTO: 0.1 K/UL — SIGNIFICANT CHANGE UP (ref 0–0.5)
EOSINOPHIL NFR BLD AUTO: 3 % — SIGNIFICANT CHANGE UP (ref 0–6)
HCT VFR BLD CALC: 44.9 % — SIGNIFICANT CHANGE UP (ref 39–50)
HGB BLD-MCNC: 15.2 G/DL — SIGNIFICANT CHANGE UP (ref 13–17)
LYMPHOCYTES # BLD AUTO: 0.7 K/UL — LOW (ref 1–3.3)
LYMPHOCYTES # BLD AUTO: 15.9 % — SIGNIFICANT CHANGE UP (ref 13–44)
MCHC RBC-ENTMCNC: 33.1 PG — SIGNIFICANT CHANGE UP (ref 27–34)
MCHC RBC-ENTMCNC: 33.9 G/DL — SIGNIFICANT CHANGE UP (ref 32–36)
MCV RBC AUTO: 97.7 FL — SIGNIFICANT CHANGE UP (ref 80–100)
MONOCYTES # BLD AUTO: 0.3 K/UL — SIGNIFICANT CHANGE UP (ref 0–0.9)
MONOCYTES NFR BLD AUTO: 7.7 % — SIGNIFICANT CHANGE UP (ref 2–14)
NEUTROPHILS # BLD AUTO: 3.2 K/UL — SIGNIFICANT CHANGE UP (ref 1.8–7.4)
NEUTROPHILS NFR BLD AUTO: 73.1 % — SIGNIFICANT CHANGE UP (ref 43–77)
PLATELET # BLD AUTO: 173 K/UL — SIGNIFICANT CHANGE UP (ref 150–400)
RBC # BLD: 4.6 M/UL — SIGNIFICANT CHANGE UP (ref 4.2–5.8)
RBC # FLD: 12 % — SIGNIFICANT CHANGE UP (ref 10.3–14.5)
WBC # BLD: 4.4 K/UL — SIGNIFICANT CHANGE UP (ref 3.8–10.5)
WBC # FLD AUTO: 4.4 K/UL — SIGNIFICANT CHANGE UP (ref 3.8–10.5)

## 2018-09-13 ENCOUNTER — OUTPATIENT (OUTPATIENT)
Dept: OUTPATIENT SERVICES | Facility: HOSPITAL | Age: 63
LOS: 1 days | Discharge: HOME | End: 2018-09-13

## 2018-09-13 ENCOUNTER — APPOINTMENT (OUTPATIENT)
Dept: MRI IMAGING | Facility: CLINIC | Age: 63
End: 2018-09-13
Payer: COMMERCIAL

## 2018-09-13 ENCOUNTER — OUTPATIENT (OUTPATIENT)
Dept: OUTPATIENT SERVICES | Facility: HOSPITAL | Age: 63
LOS: 1 days | End: 2018-09-13
Payer: COMMERCIAL

## 2018-09-13 ENCOUNTER — APPOINTMENT (OUTPATIENT)
Dept: NEUROLOGY | Facility: CLINIC | Age: 63
End: 2018-09-13
Payer: COMMERCIAL

## 2018-09-13 VITALS
DIASTOLIC BLOOD PRESSURE: 77 MMHG | RESPIRATION RATE: 18 BRPM | WEIGHT: 154 LBS | SYSTOLIC BLOOD PRESSURE: 123 MMHG | TEMPERATURE: 98.1 F | HEART RATE: 69 BPM | BODY MASS INDEX: 24.17 KG/M2 | OXYGEN SATURATION: 99 % | HEIGHT: 67 IN

## 2018-09-13 DIAGNOSIS — G93.9 DISORDER OF BRAIN, UNSPECIFIED: Chronic | ICD-10-CM

## 2018-09-13 DIAGNOSIS — D64.9 ANEMIA, UNSPECIFIED: ICD-10-CM

## 2018-09-13 DIAGNOSIS — Z00.8 ENCOUNTER FOR OTHER GENERAL EXAMINATION: ICD-10-CM

## 2018-09-13 PROCEDURE — 99214 OFFICE O/P EST MOD 30 MIN: CPT

## 2018-09-13 PROCEDURE — 70553 MRI BRAIN STEM W/O & W/DYE: CPT

## 2018-09-13 PROCEDURE — 70553 MRI BRAIN STEM W/O & W/DYE: CPT | Mod: 26

## 2018-09-13 PROCEDURE — A9585: CPT

## 2018-09-14 ENCOUNTER — APPOINTMENT (OUTPATIENT)
Dept: HEMATOLOGY ONCOLOGY | Facility: CLINIC | Age: 63
End: 2018-09-14

## 2018-09-14 ENCOUNTER — RESULT REVIEW (OUTPATIENT)
Age: 63
End: 2018-09-14

## 2018-09-14 LAB
BASOPHILS # BLD AUTO: 0.1 K/UL — SIGNIFICANT CHANGE UP (ref 0–0.2)
BASOPHILS NFR BLD AUTO: 1.3 % — SIGNIFICANT CHANGE UP (ref 0–2)
EOSINOPHIL # BLD AUTO: 0.1 K/UL — SIGNIFICANT CHANGE UP (ref 0–0.5)
EOSINOPHIL NFR BLD AUTO: 2.8 % — SIGNIFICANT CHANGE UP (ref 0–6)
HCT VFR BLD CALC: 46.2 % — SIGNIFICANT CHANGE UP (ref 39–50)
HGB BLD-MCNC: 15.5 G/DL — SIGNIFICANT CHANGE UP (ref 13–17)
LYMPHOCYTES # BLD AUTO: 0.8 K/UL — LOW (ref 1–3.3)
LYMPHOCYTES # BLD AUTO: 20.5 % — SIGNIFICANT CHANGE UP (ref 13–44)
MCHC RBC-ENTMCNC: 32.9 PG — SIGNIFICANT CHANGE UP (ref 27–34)
MCHC RBC-ENTMCNC: 33.5 G/DL — SIGNIFICANT CHANGE UP (ref 32–36)
MCV RBC AUTO: 98.1 FL — SIGNIFICANT CHANGE UP (ref 80–100)
MONOCYTES # BLD AUTO: 0.3 K/UL — SIGNIFICANT CHANGE UP (ref 0–0.9)
MONOCYTES NFR BLD AUTO: 7.2 % — SIGNIFICANT CHANGE UP (ref 2–14)
NEUTROPHILS # BLD AUTO: 2.7 K/UL — SIGNIFICANT CHANGE UP (ref 1.8–7.4)
NEUTROPHILS NFR BLD AUTO: 68.3 % — SIGNIFICANT CHANGE UP (ref 43–77)
PLATELET # BLD AUTO: 139 K/UL — LOW (ref 150–400)
RBC # BLD: 4.71 M/UL — SIGNIFICANT CHANGE UP (ref 4.2–5.8)
RBC # FLD: 12 % — SIGNIFICANT CHANGE UP (ref 10.3–14.5)
WBC # BLD: 4 K/UL — SIGNIFICANT CHANGE UP (ref 3.8–10.5)
WBC # FLD AUTO: 4 K/UL — SIGNIFICANT CHANGE UP (ref 3.8–10.5)

## 2018-09-17 LAB
ALBUMIN SERPL ELPH-MCNC: 4.4 G/DL
ALP BLD-CCNC: 60 U/L
ALT SERPL-CCNC: 10 U/L
ANION GAP SERPL CALC-SCNC: 14 MMOL/L
AST SERPL-CCNC: 11 U/L
BILIRUB SERPL-MCNC: 0.4 MG/DL
BUN SERPL-MCNC: 10 MG/DL
CALCIUM SERPL-MCNC: 9.4 MG/DL
CHLORIDE SERPL-SCNC: 103 MMOL/L
CO2 SERPL-SCNC: 26 MMOL/L
CREAT SERPL-MCNC: 0.92 MG/DL
GLUCOSE SERPL-MCNC: 145 MG/DL
POTASSIUM SERPL-SCNC: 4.1 MMOL/L
PROT SERPL-MCNC: 6.6 G/DL
SODIUM SERPL-SCNC: 143 MMOL/L

## 2018-09-25 ENCOUNTER — RESULT REVIEW (OUTPATIENT)
Age: 63
End: 2018-09-25

## 2018-09-25 ENCOUNTER — APPOINTMENT (OUTPATIENT)
Dept: HEMATOLOGY ONCOLOGY | Facility: CLINIC | Age: 63
End: 2018-09-25

## 2018-09-25 LAB
BASOPHILS # BLD AUTO: 0 K/UL — SIGNIFICANT CHANGE UP (ref 0–0.2)
BASOPHILS NFR BLD AUTO: 0.3 % — SIGNIFICANT CHANGE UP (ref 0–2)
EOSINOPHIL # BLD AUTO: 0.2 K/UL — SIGNIFICANT CHANGE UP (ref 0–0.5)
EOSINOPHIL NFR BLD AUTO: 3.6 % — SIGNIFICANT CHANGE UP (ref 0–6)
HCT VFR BLD CALC: 45.3 % — SIGNIFICANT CHANGE UP (ref 39–50)
HGB BLD-MCNC: 15.5 G/DL — SIGNIFICANT CHANGE UP (ref 13–17)
LYMPHOCYTES # BLD AUTO: 0.8 K/UL — LOW (ref 1–3.3)
LYMPHOCYTES # BLD AUTO: 16.1 % — SIGNIFICANT CHANGE UP (ref 13–44)
MCHC RBC-ENTMCNC: 33.1 PG — SIGNIFICANT CHANGE UP (ref 27–34)
MCHC RBC-ENTMCNC: 34.2 G/DL — SIGNIFICANT CHANGE UP (ref 32–36)
MCV RBC AUTO: 97 FL — SIGNIFICANT CHANGE UP (ref 80–100)
MONOCYTES # BLD AUTO: 0.4 K/UL — SIGNIFICANT CHANGE UP (ref 0–0.9)
MONOCYTES NFR BLD AUTO: 7.6 % — SIGNIFICANT CHANGE UP (ref 2–14)
NEUTROPHILS # BLD AUTO: 3.8 K/UL — SIGNIFICANT CHANGE UP (ref 1.8–7.4)
NEUTROPHILS NFR BLD AUTO: 72.4 % — SIGNIFICANT CHANGE UP (ref 43–77)
PLATELET # BLD AUTO: 167 K/UL — SIGNIFICANT CHANGE UP (ref 150–400)
RBC # BLD: 4.67 M/UL — SIGNIFICANT CHANGE UP (ref 4.2–5.8)
RBC # FLD: 12.1 % — SIGNIFICANT CHANGE UP (ref 10.3–14.5)
WBC # BLD: 5.2 K/UL — SIGNIFICANT CHANGE UP (ref 3.8–10.5)
WBC # FLD AUTO: 5.2 K/UL — SIGNIFICANT CHANGE UP (ref 3.8–10.5)

## 2018-10-01 ENCOUNTER — RX RENEWAL (OUTPATIENT)
Age: 63
End: 2018-10-01

## 2018-10-02 ENCOUNTER — APPOINTMENT (OUTPATIENT)
Dept: HEMATOLOGY ONCOLOGY | Facility: CLINIC | Age: 63
End: 2018-10-02

## 2018-10-05 ENCOUNTER — OTHER (OUTPATIENT)
Age: 63
End: 2018-10-05

## 2018-10-09 ENCOUNTER — APPOINTMENT (OUTPATIENT)
Dept: HEMATOLOGY ONCOLOGY | Facility: CLINIC | Age: 63
End: 2018-10-09

## 2018-10-09 ENCOUNTER — RESULT REVIEW (OUTPATIENT)
Age: 63
End: 2018-10-09

## 2018-10-09 LAB
BASOPHILS # BLD AUTO: 0 K/UL — SIGNIFICANT CHANGE UP (ref 0–0.2)
BASOPHILS NFR BLD AUTO: 0.3 % — SIGNIFICANT CHANGE UP (ref 0–2)
EOSINOPHIL # BLD AUTO: 0.2 K/UL — SIGNIFICANT CHANGE UP (ref 0–0.5)
EOSINOPHIL NFR BLD AUTO: 4.5 % — SIGNIFICANT CHANGE UP (ref 0–6)
HCT VFR BLD CALC: 46.3 % — SIGNIFICANT CHANGE UP (ref 39–50)
HGB BLD-MCNC: 15.4 G/DL — SIGNIFICANT CHANGE UP (ref 13–17)
LYMPHOCYTES # BLD AUTO: 0.8 K/UL — LOW (ref 1–3.3)
LYMPHOCYTES # BLD AUTO: 18 % — SIGNIFICANT CHANGE UP (ref 13–44)
MCHC RBC-ENTMCNC: 32.4 PG — SIGNIFICANT CHANGE UP (ref 27–34)
MCHC RBC-ENTMCNC: 33.2 G/DL — SIGNIFICANT CHANGE UP (ref 32–36)
MCV RBC AUTO: 97.6 FL — SIGNIFICANT CHANGE UP (ref 80–100)
MONOCYTES # BLD AUTO: 0.4 K/UL — SIGNIFICANT CHANGE UP (ref 0–0.9)
MONOCYTES NFR BLD AUTO: 7.7 % — SIGNIFICANT CHANGE UP (ref 2–14)
NEUTROPHILS # BLD AUTO: 3.3 K/UL — SIGNIFICANT CHANGE UP (ref 1.8–7.4)
NEUTROPHILS NFR BLD AUTO: 69.6 % — SIGNIFICANT CHANGE UP (ref 43–77)
PLATELET # BLD AUTO: 149 K/UL — LOW (ref 150–400)
RBC # BLD: 4.74 M/UL — SIGNIFICANT CHANGE UP (ref 4.2–5.8)
RBC # FLD: 11.9 % — SIGNIFICANT CHANGE UP (ref 10.3–14.5)
WBC # BLD: 4.7 K/UL — SIGNIFICANT CHANGE UP (ref 3.8–10.5)
WBC # FLD AUTO: 4.7 K/UL — SIGNIFICANT CHANGE UP (ref 3.8–10.5)

## 2018-10-10 ENCOUNTER — OUTPATIENT (OUTPATIENT)
Dept: OUTPATIENT SERVICES | Facility: HOSPITAL | Age: 63
LOS: 1 days | Discharge: HOME | End: 2018-10-10

## 2018-10-10 DIAGNOSIS — D64.9 ANEMIA, UNSPECIFIED: ICD-10-CM

## 2018-10-10 DIAGNOSIS — G93.9 DISORDER OF BRAIN, UNSPECIFIED: Chronic | ICD-10-CM

## 2018-10-18 ENCOUNTER — APPOINTMENT (OUTPATIENT)
Dept: HEMATOLOGY ONCOLOGY | Facility: CLINIC | Age: 63
End: 2018-10-18

## 2018-10-18 ENCOUNTER — APPOINTMENT (OUTPATIENT)
Dept: NEUROLOGY | Facility: CLINIC | Age: 63
End: 2018-10-18
Payer: COMMERCIAL

## 2018-10-18 ENCOUNTER — APPOINTMENT (OUTPATIENT)
Dept: MRI IMAGING | Facility: IMAGING CENTER | Age: 63
End: 2018-10-18
Payer: COMMERCIAL

## 2018-10-18 ENCOUNTER — OUTPATIENT (OUTPATIENT)
Dept: OUTPATIENT SERVICES | Facility: HOSPITAL | Age: 63
LOS: 1 days | End: 2018-10-18
Payer: COMMERCIAL

## 2018-10-18 VITALS
OXYGEN SATURATION: 98 % | HEART RATE: 74 BPM | SYSTOLIC BLOOD PRESSURE: 123 MMHG | DIASTOLIC BLOOD PRESSURE: 77 MMHG | RESPIRATION RATE: 18 BRPM

## 2018-10-18 DIAGNOSIS — Z00.8 ENCOUNTER FOR OTHER GENERAL EXAMINATION: ICD-10-CM

## 2018-10-18 DIAGNOSIS — G93.9 DISORDER OF BRAIN, UNSPECIFIED: Chronic | ICD-10-CM

## 2018-10-18 PROCEDURE — 82565 ASSAY OF CREATININE: CPT

## 2018-10-18 PROCEDURE — 99214 OFFICE O/P EST MOD 30 MIN: CPT

## 2018-10-18 PROCEDURE — 70553 MRI BRAIN STEM W/O & W/DYE: CPT

## 2018-10-18 PROCEDURE — A9585: CPT

## 2018-10-18 PROCEDURE — 70553 MRI BRAIN STEM W/O & W/DYE: CPT | Mod: 26

## 2018-10-18 RX ORDER — ONDANSETRON 8 MG/1
8 TABLET ORAL
Qty: 30 | Refills: 0 | Status: DISCONTINUED | COMMUNITY
Start: 2018-04-26 | End: 2018-10-18

## 2018-10-18 RX ORDER — TEMOZOLOMIDE 180 MG/1
180 CAPSULE ORAL
Qty: 10 | Refills: 0 | Status: DISCONTINUED | COMMUNITY
Start: 2018-06-19 | End: 2018-10-18

## 2018-10-19 ENCOUNTER — RESULT REVIEW (OUTPATIENT)
Age: 63
End: 2018-10-19

## 2018-10-19 ENCOUNTER — LABORATORY RESULT (OUTPATIENT)
Age: 63
End: 2018-10-19

## 2018-10-19 ENCOUNTER — APPOINTMENT (OUTPATIENT)
Dept: HEMATOLOGY ONCOLOGY | Facility: CLINIC | Age: 63
End: 2018-10-19

## 2018-10-19 LAB
HCT VFR BLD CALC: 47.4 % — SIGNIFICANT CHANGE UP (ref 39–50)
HGB BLD-MCNC: 15.7 G/DL — SIGNIFICANT CHANGE UP (ref 13–17)
MCHC RBC-ENTMCNC: 32.6 PG — SIGNIFICANT CHANGE UP (ref 27–34)
MCHC RBC-ENTMCNC: 33.1 G/DL — SIGNIFICANT CHANGE UP (ref 32–36)
MCV RBC AUTO: 98.6 FL — SIGNIFICANT CHANGE UP (ref 80–100)
PLATELET # BLD AUTO: 153 K/UL — SIGNIFICANT CHANGE UP (ref 150–400)
RBC # BLD: 4.81 M/UL — SIGNIFICANT CHANGE UP (ref 4.2–5.8)
RBC # FLD: 11.8 % — SIGNIFICANT CHANGE UP (ref 10.3–14.5)
WBC # BLD: 4.6 K/UL — SIGNIFICANT CHANGE UP (ref 3.8–10.5)
WBC # FLD AUTO: 4.6 K/UL — SIGNIFICANT CHANGE UP (ref 3.8–10.5)

## 2019-01-03 ENCOUNTER — APPOINTMENT (OUTPATIENT)
Dept: MRI IMAGING | Facility: IMAGING CENTER | Age: 64
End: 2019-01-03
Payer: COMMERCIAL

## 2019-01-03 ENCOUNTER — OUTPATIENT (OUTPATIENT)
Dept: OUTPATIENT SERVICES | Facility: HOSPITAL | Age: 64
LOS: 1 days | End: 2019-01-03
Payer: COMMERCIAL

## 2019-01-03 ENCOUNTER — APPOINTMENT (OUTPATIENT)
Dept: NEUROLOGY | Facility: CLINIC | Age: 64
End: 2019-01-03
Payer: COMMERCIAL

## 2019-01-03 VITALS
HEART RATE: 66 BPM | RESPIRATION RATE: 18 BRPM | TEMPERATURE: 97.9 F | HEIGHT: 67 IN | SYSTOLIC BLOOD PRESSURE: 117 MMHG | BODY MASS INDEX: 24.17 KG/M2 | WEIGHT: 154 LBS | OXYGEN SATURATION: 89 % | DIASTOLIC BLOOD PRESSURE: 76 MMHG

## 2019-01-03 DIAGNOSIS — G93.9 DISORDER OF BRAIN, UNSPECIFIED: Chronic | ICD-10-CM

## 2019-01-03 DIAGNOSIS — Z00.8 ENCOUNTER FOR OTHER GENERAL EXAMINATION: ICD-10-CM

## 2019-01-03 PROCEDURE — 70553 MRI BRAIN STEM W/O & W/DYE: CPT

## 2019-01-03 PROCEDURE — 99214 OFFICE O/P EST MOD 30 MIN: CPT

## 2019-01-03 PROCEDURE — 82565 ASSAY OF CREATININE: CPT

## 2019-01-03 PROCEDURE — 70553 MRI BRAIN STEM W/O & W/DYE: CPT | Mod: 26

## 2019-01-03 PROCEDURE — A9585: CPT

## 2019-01-25 NOTE — ASU PATIENT PROFILE, ADULT - TEACHING/LEARNING LEARNING PREFERENCES
"ED Provider Note    CHIEF COMPLAINT  Chief Complaint   Patient presents with   • Constipation     For past 3 days. Pt had recent knee surgery and has been taking pain medications. Tried OTC meds and an enema \"which I could not get in the right position for because my knee pain\". Denies vomiting. +abd cramping    • Nausea       HPI  James Hoffman is a 83 y.o. male who presents with constipation.  He has not had a bowel movement 3 days.  He had knee surgery and is on narcotic pain medications had troubles with his stools.  He is passing some liquid now comes in for evaluation he does have some cramping in the abdomen but no persistent pain no fever chills all other systems negative    REVIEW OF SYSTEMS  See HPI for further details    PAST MEDICAL HISTORY  Past Medical History:   Diagnosis Date   • Allergic rhinitis    • ASTHMA    • Basal cell carcinoma of face 05/2004    Dr. Lucas   • Bowel habit changes 01/03/2019    Constipation   • Cancer (HCC) approx 2015 last    Basal cell of face.   • Cataract 2018    Bilateral phaco with IOL   • Colon polyp, hyperplastic 5/04   • CVA (cerebral vascular accident) (HCC) 01/2003    Residual diplopia, Dr. Cleaning   • Diabetes (HCC)     diet controlled. 1/3/19-does not check glucose at home.   • Dysphagia 2013    Dr. Joseph, vallecular lesion removed   • GOUT    • Heart burn     treated with zantac.   • Hemorrhagic disorder (HCC)     Due to plavix. Bleeds and bruises readily.    • HYPOTHYROIDISM 2014   • Indigestion     treated with zantac   • Left knee pain 01/03/2019    with activity   • Rosacea     Dr. Lucas   • TIA 12/2000, 2017       FAMILY HISTORY  Family History   Problem Relation Age of Onset   • Diabetes Mother    • Genetic Mother         dementia   • Heart Disease Sister    • Cancer Sister         breast cancer   • Diabetes Maternal Aunt    • Diabetes Maternal Grandmother    • Diabetes Maternal Grandfather    • Genetic Son         ALS       SOCIAL HISTORY  Social " "History     Social History   • Marital status:      Spouse name: Willa   • Number of children: 3   • Years of education: college     Occupational History   • Retired Retired     Social History Main Topics   • Smoking status: Former Smoker     Packs/day: 1.00     Years: 10.00     Types: Cigarettes     Quit date: 1/15/1963   • Smokeless tobacco: Never Used      Comment: quit 1963. h/o 1 ppd x 10 years   • Alcohol use Yes      Comment: 1 per day   • Drug use: No   • Sexual activity: Yes     Partners: Female     Other Topics Concern   • Not on file     Social History Narrative   • No narrative on file       SURGICAL HISTORY  Past Surgical History:   Procedure Laterality Date   • KNEE ARTHROPLASTY TOTAL Left 1/14/2019    Procedure: KNEE ARTHROPLASTY TOTAL;  Surgeon: Jean Claude Meyer M.D.;  Location: SURGERY [facility];  Service: Orthopedics   • MICROLARYNGOSCOPY  1/21/2014    Performed by Jimbo Joseph M.D. at SURGERY [facility]   • KNEE ARTHROSCOPY Left 2013   • COLONOSCOPY WITH POLYP  5/2004    Hyperplastic   • ACL RECONSTRUCTION Right 10/2002     Dr. Engle   • TONSILLECTOMY  as child       CURRENT MEDICATIONS  Home Medications    **Home medications have not yet been reviewed for this encounter**         ALLERGIES  Allergies   Allergen Reactions   • Penicillins Anaphylaxis       PHYSICAL EXAM  VITAL SIGNS: BP (!) 120/35   Pulse 93   Temp 36.3 °C (97.4 °F) (Temporal)   Resp 18   Ht 1.727 m (5' 8\")   Wt 78 kg (172 lb)   SpO2 97%   BMI 26.15 kg/m²     Constitutional: Patient is alert and oriented x3 in mild distress   HENT: Moist mucous membranes  Eyes:   No conjunctivitis or icterus  Neck: Trach is midline  Cardiovascular: Normal heart rate    Thorax & Lungs: Clear to auscultation  Rectal: Patient does have a fecal impaction broken up digitally  Abdomen: Nontender palpation    Psychiatric: Affect normal, Judgment normal, Mood normal.           COURSE & MEDICAL DECISION " MAKING  Pertinent Labs & Imaging studies reviewed. (See chart for details)  Patient had enema given x2 with some but not.  I am discharging him with magnesium citrate glycerin suppository return precautions and follow-up    FINAL IMPRESSION  1.   1. Drug-induced constipation        2.   3.         Electronically signed by: Erasmo Caruso, 1/24/2019 5:45 PM       skill demonstration/verbal instruction/written material

## 2019-02-27 ENCOUNTER — FORM ENCOUNTER (OUTPATIENT)
Age: 64
End: 2019-02-27

## 2019-02-28 ENCOUNTER — APPOINTMENT (OUTPATIENT)
Dept: NEUROLOGY | Facility: CLINIC | Age: 64
End: 2019-02-28
Payer: COMMERCIAL

## 2019-02-28 ENCOUNTER — OUTPATIENT (OUTPATIENT)
Dept: OUTPATIENT SERVICES | Facility: HOSPITAL | Age: 64
LOS: 1 days | End: 2019-02-28
Payer: COMMERCIAL

## 2019-02-28 ENCOUNTER — APPOINTMENT (OUTPATIENT)
Dept: MRI IMAGING | Facility: IMAGING CENTER | Age: 64
End: 2019-02-28
Payer: COMMERCIAL

## 2019-02-28 VITALS
RESPIRATION RATE: 18 BRPM | HEART RATE: 88 BPM | DIASTOLIC BLOOD PRESSURE: 78 MMHG | TEMPERATURE: 97.9 F | SYSTOLIC BLOOD PRESSURE: 117 MMHG | HEIGHT: 67 IN | OXYGEN SATURATION: 98 %

## 2019-02-28 DIAGNOSIS — G93.9 DISORDER OF BRAIN, UNSPECIFIED: Chronic | ICD-10-CM

## 2019-02-28 DIAGNOSIS — Z00.8 ENCOUNTER FOR OTHER GENERAL EXAMINATION: ICD-10-CM

## 2019-02-28 PROCEDURE — 99214 OFFICE O/P EST MOD 30 MIN: CPT

## 2019-02-28 PROCEDURE — A9585: CPT

## 2019-02-28 PROCEDURE — 70553 MRI BRAIN STEM W/O & W/DYE: CPT | Mod: 26

## 2019-02-28 PROCEDURE — 70553 MRI BRAIN STEM W/O & W/DYE: CPT

## 2019-02-28 NOTE — DISCUSSION/SUMMARY
[FreeTextEntry1] : Patient seen and examined\par Clinically and radiographically stable\par Recommended annual follow up with his PCP\par Will do a clinical follow up along with an MRI in 2 months. Explained to the patient regarding the importance of closely monitoring the tumor and significance of the MRI (April 25th)\par In the interim, if any questions patient knows to call me.

## 2019-02-28 NOTE — HISTORY OF PRESENT ILLNESS
[FreeTextEntry1] : Kendall Romano is a 63 yo male who presents today along with his daughter for a neuro oncology follow up. 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 Resection of the tumor when he p/w seizures followed by treatment with TMZ x 12 months\par Followed expectantly\par Feb, 2017- p/w focal seizures and LUE weakness. MRI revealed regrowth. \par Resection on 1/8/2018.\par Currently on Chemoradiation. RT started on 2/6/2018. Tolerating Temodar well . \par 4/26 - MRI showed small area of enhancement\par Completed 6 cycles of TMZ. \par 6th cycle of TMZ from 9/16-9/20\par 10/2018, 1/2019 -  MRI stable\par Patient here with a new MRI. Offers no new complaints\par Denies headache, seizures, increased weakness, gait instability. \par  \par

## 2019-02-28 NOTE — PHYSICAL EXAM
[General Appearance - Alert] : alert [General Appearance - In No Acute Distress] : in no acute distress [General Appearance - Well-Appearing] : healthy appearing [] : no respiratory distress [Respiration, Rhythm And Depth] : normal respiratory rhythm and effort [Exaggerated Use Of Accessory Muscles For Inspiration] : no accessory muscle use [Edema] : there was no peripheral edema [Abnormal Walk] : normal gait [FreeTextEntry1] : Patient seen and examined\par Alert, awake , Oriented X 3. Speech clear and fluent\par PERRLA, EOMI, VFF\par Face symmetrical. Tongue protrudes midline\par HOBBS x 4 with good and equal strength with reduced left hand grasp\par Decreased FFM on the left - stable.\par Sensation intact bilaterally\par Gait steady. Ambulating independently

## 2019-04-30 ENCOUNTER — APPOINTMENT (OUTPATIENT)
Dept: NEUROLOGY | Facility: CLINIC | Age: 64
End: 2019-04-30
Payer: COMMERCIAL

## 2019-04-30 ENCOUNTER — APPOINTMENT (OUTPATIENT)
Dept: MRI IMAGING | Facility: IMAGING CENTER | Age: 64
End: 2019-04-30
Payer: COMMERCIAL

## 2019-04-30 ENCOUNTER — OUTPATIENT (OUTPATIENT)
Dept: OUTPATIENT SERVICES | Facility: HOSPITAL | Age: 64
LOS: 1 days | End: 2019-04-30
Payer: COMMERCIAL

## 2019-04-30 VITALS
WEIGHT: 154 LBS | SYSTOLIC BLOOD PRESSURE: 119 MMHG | DIASTOLIC BLOOD PRESSURE: 74 MMHG | HEART RATE: 78 BPM | TEMPERATURE: 98.1 F | HEIGHT: 67 IN | OXYGEN SATURATION: 98 % | BODY MASS INDEX: 24.17 KG/M2 | RESPIRATION RATE: 18 BRPM

## 2019-04-30 DIAGNOSIS — C71.9 MALIGNANT NEOPLASM OF BRAIN, UNSPECIFIED: ICD-10-CM

## 2019-04-30 DIAGNOSIS — G93.9 DISORDER OF BRAIN, UNSPECIFIED: Chronic | ICD-10-CM

## 2019-04-30 PROCEDURE — 99214 OFFICE O/P EST MOD 30 MIN: CPT

## 2019-04-30 PROCEDURE — 70553 MRI BRAIN STEM W/O & W/DYE: CPT | Mod: 26

## 2019-04-30 PROCEDURE — 70553 MRI BRAIN STEM W/O & W/DYE: CPT

## 2019-04-30 PROCEDURE — A9585: CPT

## 2019-04-30 NOTE — HISTORY OF PRESENT ILLNESS
[FreeTextEntry1] : Kendall Romano is a 63 yo male who presents today along with his daughter for a neuro oncology follow up. 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 Resection of the tumor when he p/w seizures followed by treatment with TMZ x 12 months\par Followed expectantly\par Feb, 2017- p/w focal seizures and LUE weakness. MRI revealed regrowth. \par Resection on 1/8/2018.\par Currently on Chemoradiation. RT started on 2/6/2018. Tolerating Temodar well . \par 4/26 - MRI showed small area of enhancement\par Completed 6 cycles of TMZ. \par 6th cycle of TMZ from 9/16-9/20\par 10/2018, 1/2019, 2/2019 - MRI stable\par Patient here with a new MRI. Offers no new complaints\par Denies headache, seizures, increased weakness, gait instability. \par  \par \par

## 2019-04-30 NOTE — PHYSICAL EXAM
[General Appearance - Alert] : alert [General Appearance - In No Acute Distress] : in no acute distress [General Appearance - Well-Appearing] : healthy appearing [] : no respiratory distress [Respiration, Rhythm And Depth] : normal respiratory rhythm and effort [Exaggerated Use Of Accessory Muscles For Inspiration] : no accessory muscle use [Edema] : there was no peripheral edema [Abnormal Walk] : normal gait [FreeTextEntry1] : Patient seen and examined\par Alert, awake , Oriented X 3. Speech clear and fluent\par PERRLA, EOMI, VFF\par Face symmetrical. Tongue protrudes midline\par HOBBS x 4 with good and equal strength with reduced left hand grasp\par Decreased FFM on the left - stable.\par Sensation intact bilaterally\par Gait steady. Ambulating independently.

## 2019-04-30 NOTE — DISCUSSION/SUMMARY
[FreeTextEntry1] : Patient seen and examined\par Clinically stable\par MRI reviewed and explained - Official report to follow - to my review there is no new enhancement noted along in the right frontoparietal cavity. Surrounding flair abnormality appears unchanged.\par Will do a clinical follow up along with MRI in 2 months ( July, 2 2019)\par In the interim, if any questions patient knows to call me

## 2019-06-25 ENCOUNTER — CHART COPY (OUTPATIENT)
Age: 64
End: 2019-06-25

## 2019-07-02 ENCOUNTER — APPOINTMENT (OUTPATIENT)
Dept: NEUROLOGY | Facility: CLINIC | Age: 64
End: 2019-07-02
Payer: COMMERCIAL

## 2019-07-02 ENCOUNTER — OTHER (OUTPATIENT)
Age: 64
End: 2019-07-02

## 2019-07-02 ENCOUNTER — APPOINTMENT (OUTPATIENT)
Dept: MRI IMAGING | Facility: IMAGING CENTER | Age: 64
End: 2019-07-02
Payer: COMMERCIAL

## 2019-07-02 ENCOUNTER — OUTPATIENT (OUTPATIENT)
Dept: OUTPATIENT SERVICES | Facility: HOSPITAL | Age: 64
LOS: 1 days | End: 2019-07-02
Payer: COMMERCIAL

## 2019-07-02 DIAGNOSIS — G93.9 DISORDER OF BRAIN, UNSPECIFIED: Chronic | ICD-10-CM

## 2019-07-02 DIAGNOSIS — C71.9 MALIGNANT NEOPLASM OF BRAIN, UNSPECIFIED: ICD-10-CM

## 2019-07-02 PROCEDURE — 70553 MRI BRAIN STEM W/O & W/DYE: CPT

## 2019-07-02 PROCEDURE — A9585: CPT

## 2019-07-02 PROCEDURE — 70553 MRI BRAIN STEM W/O & W/DYE: CPT | Mod: 26

## 2019-07-02 PROCEDURE — 99214 OFFICE O/P EST MOD 30 MIN: CPT

## 2019-07-02 NOTE — DISCUSSION/SUMMARY
[FreeTextEntry1] : I have reviewed today's MRI and have compared it to prior exams dated 4/30 and 2/28 - to my review, right parietal resection cavity remains without enhancement. Surrounding T2/ flair change is stable.\par Official report is pending.\par \par He is clinically and radiographically stable.\par Plan is to follow up in 2 months with a repeat MRI brain and a clinical visit.\par

## 2019-07-02 NOTE — HISTORY OF PRESENT ILLNESS
[FreeTextEntry1] : Kendall Romano is a 63 yo male who presents today along with his daughter for a neuro oncology follow up. 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 Resection of the tumor when he p/w seizures followed by treatment with TMZ x 12 months\par Followed expectantly\par Feb, 2017- p/w focal seizures and LUE weakness. MRI revealed regrowth. \par Resection on 1/8/2018.\par Currently on Chemoradiation. RT started on 2/6/2018. Tolerating Temodar well . \par 4/26 - MRI showed small area of enhancement\par Completed 6 cycles of TMZ. \par 6th cycle of TMZ from 9/16-9/20\par 10/2018, 1/2019, 2/2019, 4/2019 - MRI stable\par Patient here with a new MRI. Offers no new complaints\par Denies headache, seizures, increased weakness, gait instability. \par  \par

## 2019-07-02 NOTE — PHYSICAL EXAM
[FreeTextEntry1] : Mr. Romano is awake and alert - oriented and fluent.\par EOMI, VFF, Face is symmetric and tongue protrudes midline\par There is no pronator drift\par FFM slightly slower on the left with left upward drift ( not new).\par Strength is full.\par Gait is independent and steady.

## 2019-09-09 ENCOUNTER — FORM ENCOUNTER (OUTPATIENT)
Age: 64
End: 2019-09-09

## 2019-09-10 ENCOUNTER — OUTPATIENT (OUTPATIENT)
Dept: OUTPATIENT SERVICES | Facility: HOSPITAL | Age: 64
LOS: 1 days | End: 2019-09-10
Payer: COMMERCIAL

## 2019-09-10 ENCOUNTER — APPOINTMENT (OUTPATIENT)
Dept: MRI IMAGING | Facility: IMAGING CENTER | Age: 64
End: 2019-09-10
Payer: COMMERCIAL

## 2019-09-10 ENCOUNTER — APPOINTMENT (OUTPATIENT)
Dept: NEUROLOGY | Facility: CLINIC | Age: 64
End: 2019-09-10
Payer: COMMERCIAL

## 2019-09-10 VITALS
SYSTOLIC BLOOD PRESSURE: 118 MMHG | HEIGHT: 67 IN | BODY MASS INDEX: 24.17 KG/M2 | RESPIRATION RATE: 18 BRPM | WEIGHT: 154 LBS | HEART RATE: 78 BPM | DIASTOLIC BLOOD PRESSURE: 78 MMHG | TEMPERATURE: 98 F

## 2019-09-10 DIAGNOSIS — C71.9 MALIGNANT NEOPLASM OF BRAIN, UNSPECIFIED: ICD-10-CM

## 2019-09-10 DIAGNOSIS — G93.9 DISORDER OF BRAIN, UNSPECIFIED: Chronic | ICD-10-CM

## 2019-09-10 DIAGNOSIS — Z00.00 ENCOUNTER FOR GENERAL ADULT MEDICAL EXAMINATION W/OUT ABNORMAL FINDINGS: ICD-10-CM

## 2019-09-10 PROCEDURE — 99214 OFFICE O/P EST MOD 30 MIN: CPT

## 2019-09-10 PROCEDURE — 70553 MRI BRAIN STEM W/O & W/DYE: CPT

## 2019-09-10 PROCEDURE — A9585: CPT

## 2019-09-10 PROCEDURE — 70553 MRI BRAIN STEM W/O & W/DYE: CPT | Mod: 26

## 2019-09-10 RX ORDER — ATORVASTATIN CALCIUM 10 MG/1
10 TABLET, FILM COATED ORAL
Qty: 30 | Refills: 0 | Status: ACTIVE | COMMUNITY
Start: 2019-09-10

## 2019-09-10 NOTE — PHYSICAL EXAM
[General Appearance - Alert] : alert [General Appearance - In No Acute Distress] : in no acute distress [General Appearance - Well Nourished] : well nourished [General Appearance - Well-Appearing] : healthy appearing [] : no respiratory distress [Respiration, Rhythm And Depth] : normal respiratory rhythm and effort [Exaggerated Use Of Accessory Muscles For Inspiration] : no accessory muscle use [Edema] : there was no peripheral edema [Abnormal Walk] : normal gait [FreeTextEntry1] : Patient seen and examined\par Alert, awake , Oriented X 3. Speech clear and fluent\par PERRLA, EOMI, VFF\par Face symmetric. Tongue protrudes midline\par HOBBS x 4 with good and equal strength with reduced left hand grasp\par Decreased FFM on the left - stable.\par Sensation intact bilaterally\par Gait steady. Ambulating independently.

## 2019-09-10 NOTE — HISTORY OF PRESENT ILLNESS
[FreeTextEntry1] : Kendall Romano is a 61 yo male who presents today along with his daughter for a neuro oncology follow up. 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 Resection of the tumor when he p/w seizures followed by treatment with TMZ x 12 months\par Followed expectantly\par Feb, 2017- p/w focal seizures and LUE weakness. MRI revealed regrowth. \par Resection on 1/8/2018.\par Currently on Chemoradiation. RT started on 2/6/2018. Tolerating Temodar well . \par 4/26 - MRI showed small area of enhancement\par 6th cycle of TMZ from 9/16-9/20\par Completed 6 cycles of TMZ. \par 10/2018, 1/2019, 2/2019, 4/2019,7/2019 - MRI stable\par 9/10/2019- Patient here for a follow up. Offers no new complaints\par \par Denies headache, seizures, increased weakness, gait instability. \par  \par \par

## 2019-09-10 NOTE — DISCUSSION/SUMMARY
[FreeTextEntry1] : Patient seen and examined\par Clinically stable.\par MRI reviewed and explained. To my review, it appears stable\par Will do a clinical follow up along with an MRI in 3 months\par In the interim, if any questions patient knows to call me

## 2019-09-17 NOTE — ASU PATIENT PROFILE, ADULT - PATIENT REPRESENTATIVE NAME
OFFICE NOTE  [unfilled]  [id number]  9/17/2019       CHIEF COMPLAINT:  .  Renal calculi     HISTORY OF PRESENT ILLNESS:  This 65-year-old male returns for recheck to discuss his 24 hr urine stone workup for evaluation of his history of urinary calculi.  He has had no stone episodes since his last visit with us on 07/23/2019 for prior KUB on 07/18/2019 did reveal bilateral  nonobstructing renal calculi.  He states he does still pass few stones occasionally every now and then.  The 24 hr urine stone workup revealed as the most significant findings a low urinary volume of 0.91 L elevated oxalate 63 an elevated uric acid at 0.924 and significant elevated supersaturation of calcium oxalate at 11.17.  Patient had been on allopurinol in the past but he states that he discontinued it 2-3 months ago.  He has no specific complaints on today's visit.    PSA - 0.46 on 12/07/2018       FINAL IMPRESSION:   Renal calculi     RECOMMENDATIONS: .  Increase p.o. Fluids, and will obtain CMP and serum uric acid levels and contact patient with results otherwise routine recheck in 4 months for follow-up recheck.   he was also given a prescription for Norco 05/03/2025 per the patient's request in case of any  pain episodes.  
Nirmala Romano

## 2019-09-21 ENCOUNTER — TRANSCRIPTION ENCOUNTER (OUTPATIENT)
Age: 64
End: 2019-09-21

## 2019-09-21 NOTE — PROGRESS NOTE ADULT - ATTENDING COMMENTS
Seen and examined.  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  Improved per patient    oligodendroglioma  a.  NVS Q 4  b.  Continue decadron    Seizure disorder  a.  Continue dilantin   b.  On HSQ    DM  a  On carb consitent diet  b.  On ISS with good control  c.  Check FS.     Discharge from unit
Seen and examined.  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  Neurosurgery notes present of weakness preop.  No seizure activity noted.    oligodendroglioma  a.  NVS Q 1  b.  Obtain MRI  c.  Continue decadron    Seizure disorder  a.  Continue dilantin   b.  Suggest restrart DVT prophylaxis with HSQ after 24hrs post-op    DM  a  On carb consitent diet  b.  On ISS with good control  c.  Check FS
no peripheral edema/no palpitations/no dyspnea on exertion/no chest pain

## 2019-12-17 ENCOUNTER — APPOINTMENT (OUTPATIENT)
Dept: NEUROLOGY | Facility: CLINIC | Age: 64
End: 2019-12-17
Payer: COMMERCIAL

## 2019-12-17 ENCOUNTER — APPOINTMENT (OUTPATIENT)
Dept: MRI IMAGING | Facility: IMAGING CENTER | Age: 64
End: 2019-12-17
Payer: COMMERCIAL

## 2019-12-17 ENCOUNTER — OUTPATIENT (OUTPATIENT)
Dept: OUTPATIENT SERVICES | Facility: HOSPITAL | Age: 64
LOS: 1 days | End: 2019-12-17
Payer: COMMERCIAL

## 2019-12-17 VITALS
DIASTOLIC BLOOD PRESSURE: 75 MMHG | TEMPERATURE: 98 F | OXYGEN SATURATION: 99 % | SYSTOLIC BLOOD PRESSURE: 119 MMHG | RESPIRATION RATE: 18 BRPM | HEIGHT: 67 IN | HEART RATE: 75 BPM

## 2019-12-17 DIAGNOSIS — G93.9 DISORDER OF BRAIN, UNSPECIFIED: Chronic | ICD-10-CM

## 2019-12-17 DIAGNOSIS — C71.9 MALIGNANT NEOPLASM OF BRAIN, UNSPECIFIED: ICD-10-CM

## 2019-12-17 PROCEDURE — A9585: CPT

## 2019-12-17 PROCEDURE — 70553 MRI BRAIN STEM W/O & W/DYE: CPT

## 2019-12-17 PROCEDURE — 70553 MRI BRAIN STEM W/O & W/DYE: CPT | Mod: 26

## 2019-12-17 PROCEDURE — 99215 OFFICE O/P EST HI 40 MIN: CPT

## 2019-12-17 NOTE — DISCUSSION/SUMMARY
No [FreeTextEntry1] : Patient seen and examined.\par Clinically stable.\par MRI reviewed and appears stable to me . Official report to follow.\par Will do a clinical follow up in 3 months.\par In the interim, if any questions patient knows to call me.

## 2019-12-17 NOTE — PHYSICAL EXAM
[General Appearance - Alert] : alert [General Appearance - In No Acute Distress] : in no acute distress [General Appearance - Well-Appearing] : healthy appearing [Impaired Insight] : insight and judgment were intact [Oriented To Time, Place, And Person] : oriented to person, place, and time [Affect] : the affect was normal [Edema] : there was no peripheral edema [Abnormal Walk] : normal gait [FreeTextEntry1] : Patient seen and examined\par Alert, awake , Oriented X 3. Speech clear and fluent\par PERRLA, EOMI, VFF\par Face symmetric. Tongue protrudes midline\par HOBBS x 4 with good and equal strength with reduced left hand grasp\par Decreased FFM on the left - stable.\par Sensation intact bilaterally\par Gait steady. Ambulating independently.

## 2020-03-10 ENCOUNTER — APPOINTMENT (OUTPATIENT)
Dept: MRI IMAGING | Facility: IMAGING CENTER | Age: 65
End: 2020-03-10
Payer: COMMERCIAL

## 2020-03-10 ENCOUNTER — OUTPATIENT (OUTPATIENT)
Dept: OUTPATIENT SERVICES | Facility: HOSPITAL | Age: 65
LOS: 1 days | End: 2020-03-10
Payer: COMMERCIAL

## 2020-03-10 ENCOUNTER — APPOINTMENT (OUTPATIENT)
Dept: NEUROLOGY | Facility: CLINIC | Age: 65
End: 2020-03-10
Payer: COMMERCIAL

## 2020-03-10 VITALS
RESPIRATION RATE: 18 BRPM | SYSTOLIC BLOOD PRESSURE: 131 MMHG | DIASTOLIC BLOOD PRESSURE: 79 MMHG | OXYGEN SATURATION: 97 % | WEIGHT: 154 LBS | BODY MASS INDEX: 24.17 KG/M2 | HEART RATE: 86 BPM | HEIGHT: 67 IN

## 2020-03-10 DIAGNOSIS — G93.9 DISORDER OF BRAIN, UNSPECIFIED: Chronic | ICD-10-CM

## 2020-03-10 DIAGNOSIS — C71.9 MALIGNANT NEOPLASM OF BRAIN, UNSPECIFIED: ICD-10-CM

## 2020-03-10 PROCEDURE — 70553 MRI BRAIN STEM W/O & W/DYE: CPT

## 2020-03-10 PROCEDURE — 70553 MRI BRAIN STEM W/O & W/DYE: CPT | Mod: 26

## 2020-03-10 PROCEDURE — A9585: CPT

## 2020-03-10 PROCEDURE — 99215 OFFICE O/P EST HI 40 MIN: CPT

## 2020-03-10 NOTE — PHYSICAL EXAM
[General Appearance - Alert] : alert [General Appearance - In No Acute Distress] : in no acute distress [General Appearance - Well Nourished] : well nourished [Oriented To Time, Place, And Person] : oriented to person, place, and time [Impaired Insight] : insight and judgment were intact [Affect] : the affect was normal [] : no respiratory distress [Respiration, Rhythm And Depth] : normal respiratory rhythm and effort [Exaggerated Use Of Accessory Muscles For Inspiration] : no accessory muscle use [Heart Rate And Rhythm] : heart rate was normal and rhythm regular [Edema] : there was no peripheral edema [Abnormal Walk] : normal gait [FreeTextEntry1] : \par Patient seen and examined\par Alert, awake , Oriented X 3. Speech clear and fluent\par PERRLA, EOMI, VFF\par Face symmetric. Tongue protrudes midline\par HOBBS x 4 with good and equal strength with reduced left hand grasp\par Decreased FFM on the left - stable.\par Sensation intact bilaterally\par Gait steady. Ambulating independently. \par \par LE without evidence of swelling - warm with normal pulses.

## 2020-03-10 NOTE — DISCUSSION/SUMMARY
[FreeTextEntry1] : Patient seen and examined\par Clinically stable\par I have reviewed his MRI scan from this morning and have compared it to priors dating back to 4/19. Right parietal surgical cavity without new enhancement and surrounding flair abnormality is without change.\par Official report is pending.\par Will do a clinical follow up along with an MRI on June 16 \par In the interim, if he has any questions patient knows to call me

## 2020-03-10 NOTE — HISTORY OF PRESENT ILLNESS
[FreeTextEntry1] : Kendall Romano is a 61 yo male who presents today along with his daughter for a neuro oncology follow up. 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 Resection of the tumor when he p/w seizures followed by treatment with TMZ x 12 months\par Followed expectantly\par Feb, 2017- p/w focal seizures and LUE weakness. MRI revealed regrowth. \par Resection on 1/8/2018.\par Currently on Chemoradiation. RT started on 2/6/2018. Tolerating Temodar well . \par 4/26 - MRI showed small area of enhancement\par 6th cycle of TMZ from 9/16-9/20\par Completed 6 cycles of TMZ. \par 10/2018, 1/2019, 2/2019, 4/2019,7/2019, 9/2019, 12/2019- MRI stable\par 3/10/2020- Patient here for a follow up. Offers no new complaints\par \par Denies headache, seizures, increased weakness, gait instability.

## 2020-06-16 ENCOUNTER — OUTPATIENT (OUTPATIENT)
Dept: OUTPATIENT SERVICES | Facility: HOSPITAL | Age: 65
LOS: 1 days | End: 2020-06-16
Payer: COMMERCIAL

## 2020-06-16 ENCOUNTER — APPOINTMENT (OUTPATIENT)
Dept: MRI IMAGING | Facility: IMAGING CENTER | Age: 65
End: 2020-06-16
Payer: COMMERCIAL

## 2020-06-16 ENCOUNTER — APPOINTMENT (OUTPATIENT)
Dept: NEUROLOGY | Facility: CLINIC | Age: 65
End: 2020-06-16
Payer: COMMERCIAL

## 2020-06-16 VITALS
WEIGHT: 154 LBS | HEIGHT: 67 IN | RESPIRATION RATE: 18 BRPM | BODY MASS INDEX: 24.17 KG/M2 | SYSTOLIC BLOOD PRESSURE: 115 MMHG | TEMPERATURE: 97.8 F | DIASTOLIC BLOOD PRESSURE: 78 MMHG | HEART RATE: 77 BPM

## 2020-06-16 DIAGNOSIS — C71.9 MALIGNANT NEOPLASM OF BRAIN, UNSPECIFIED: ICD-10-CM

## 2020-06-16 DIAGNOSIS — G93.9 DISORDER OF BRAIN, UNSPECIFIED: Chronic | ICD-10-CM

## 2020-06-16 PROCEDURE — 70553 MRI BRAIN STEM W/O & W/DYE: CPT | Mod: 26

## 2020-06-16 PROCEDURE — 70553 MRI BRAIN STEM W/O & W/DYE: CPT

## 2020-06-16 PROCEDURE — A9585: CPT

## 2020-06-16 PROCEDURE — 99215 OFFICE O/P EST HI 40 MIN: CPT

## 2020-06-16 NOTE — PHYSICAL EXAM
[General Appearance - Alert] : alert [General Appearance - In No Acute Distress] : in no acute distress [General Appearance - Well Nourished] : well nourished [] : no respiratory distress [Exaggerated Use Of Accessory Muscles For Inspiration] : no accessory muscle use [Respiration, Rhythm And Depth] : normal respiratory rhythm and effort [FreeTextEntry1] : Patient seen and examined\par Alert, awake , Oriented X 3. Speech clear and fluent\par PERRLA, EOMI, VFF\par Face symmetric. Tongue protrudes midline\par HOBBS x 4 with good and equal strength with reduced left hand grasp\par Decreased FFM on the left - stable.\par Sensation intact bilaterally\par Gait steady. Ambulating independently. \par \par

## 2020-06-16 NOTE — DISCUSSION/SUMMARY
[FreeTextEntry1] : Patient seen and examined\par Clinically and radiographically stable - needs Perfusion imaging with the next scan.\par Recommended to take Keppra and explained the side effects\par Will repeat MRI along with a follow up in 3 months\par In the interim, if any questions patient knows to call me\par

## 2020-06-16 NOTE — HISTORY OF PRESENT ILLNESS
[FreeTextEntry1] : Kendall Romano is a 61 yo male who presents today  for a neuro oncology follow up. 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 Resection of the tumor when he p/w seizures followed by treatment with TMZ x 12 months\par Followed expectantly\par Feb, 2017- p/w focal seizures and LUE weakness. MRI revealed regrowth. \par Resection on 1/8/2018.\par Currently on Chemoradiation. RT started on 2/6/2018. Tolerating Temodar well . \par 4/26 - MRI showed small area of enhancement\par 6th cycle of TMZ from 9/16-9/20\par Completed 6 cycles of TMZ. \par 10/2018, 1/2019, 2/2019, 4/2019,7/2019, 9/2019, 12/2019, 3/2020- MRI stable\par 6/16/2020- Patient here for a follow up. Offers no new complaints. Patient stopped taking Keppra 3-4 months ago - no clear reason. No significant side effects.\par \par Denies headache, seizures, increased weakness, gait instability. \par

## 2020-10-02 ENCOUNTER — APPOINTMENT (OUTPATIENT)
Dept: MRI IMAGING | Facility: IMAGING CENTER | Age: 65
End: 2020-10-02
Payer: COMMERCIAL

## 2020-10-02 ENCOUNTER — OUTPATIENT (OUTPATIENT)
Dept: OUTPATIENT SERVICES | Facility: HOSPITAL | Age: 65
LOS: 1 days | End: 2020-10-02
Payer: COMMERCIAL

## 2020-10-02 ENCOUNTER — APPOINTMENT (OUTPATIENT)
Dept: NEUROLOGY | Facility: CLINIC | Age: 65
End: 2020-10-02
Payer: COMMERCIAL

## 2020-10-02 VITALS
HEART RATE: 72 BPM | TEMPERATURE: 97.3 F | WEIGHT: 152 LBS | BODY MASS INDEX: 23.86 KG/M2 | RESPIRATION RATE: 18 BRPM | DIASTOLIC BLOOD PRESSURE: 69 MMHG | SYSTOLIC BLOOD PRESSURE: 121 MMHG | HEIGHT: 67 IN

## 2020-10-02 DIAGNOSIS — Z00.8 ENCOUNTER FOR OTHER GENERAL EXAMINATION: ICD-10-CM

## 2020-10-02 DIAGNOSIS — C71.9 MALIGNANT NEOPLASM OF BRAIN, UNSPECIFIED: ICD-10-CM

## 2020-10-02 DIAGNOSIS — G93.9 DISORDER OF BRAIN, UNSPECIFIED: Chronic | ICD-10-CM

## 2020-10-02 PROCEDURE — 70553 MRI BRAIN STEM W/O & W/DYE: CPT

## 2020-10-02 PROCEDURE — 70553 MRI BRAIN STEM W/O & W/DYE: CPT | Mod: 26

## 2020-10-02 PROCEDURE — A9585: CPT

## 2020-10-02 PROCEDURE — 99215 OFFICE O/P EST HI 40 MIN: CPT

## 2020-10-02 NOTE — HISTORY OF PRESENT ILLNESS
[FreeTextEntry1] : Kendall Romano is a 61 yo male who presents today for a neuro oncology follow up. 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 Resection of the tumor when he p/w seizures followed by treatment with TMZ x 12 months\par Followed expectantly\par Feb, 2017- p/w focal seizures and LUE weakness. MRI revealed regrowth. \par Resection on 1/8/2018.\par Currently on Chemoradiation. RT started on 2/6/2018. Tolerating Temodar well . \par 4/26 - MRI showed small area of enhancement\par 6th cycle of TMZ from 9/16-9/20\par Completed 6 cycles of TMZ. \par 10/2018, 1/2019, 2/2019, 4/2019,7/2019, 9/2019, 12/2019, 3/2020 , 6/2020- MRI stable\par 10/2/2020- Patient here for a follow up. Offers no new complaints\par \par Denies headache, seizures, increased weakness, gait instability.

## 2020-10-02 NOTE — DISCUSSION/SUMMARY
[FreeTextEntry1] : Patient seen and examined\par Clinically stable\par MRI reviewed and explained. Official report to follow\par Will do a clinical follow up along with an MRI in 4 months\par In the interim, if any questions patient knows to call me

## 2020-10-02 NOTE — PHYSICAL EXAM
[General Appearance - Alert] : alert [General Appearance - In No Acute Distress] : in no acute distress [General Appearance - Well Nourished] : well nourished [Abnormal Walk] : normal gait [FreeTextEntry1] : Patient seen and examined\par Alert, awake , Oriented X 3. Speech clear and fluent\par PERRLA, EOMI, VFF\par Face symmetrical. Tongue protrudes midline\par HOBBS x 4 with good and equal strength\par FFM, coordination equal bilaterally\par Sensation intact bilaterally\par Gait steady. Ambulating independently\par

## 2020-11-30 NOTE — H&P PST ADULT - NS NEC GEN PE MLT EXAM PC
Answer Assessment - Initial Assessment Questions  1. REASON FOR CALL or QUESTION: \"What is your reason for calling today? \" or \"How can I best help you? \" or \"What question do you have that I can help answer? \"        Patient would like to schedule appt with provider. Transfer to [facility] at the service center for scheduling.     Protocols used: INFORMATION ONLY CALL - NO TRIAGE-ADULT-[place]
Health Maintenance Due   Topic Date Due   â¢ DTaP/Tdap/Td Vaccine (1 - Tdap) 04/24/1963   â¢ Shingles Vaccine (1 of 2) 04/24/1994       Patient is due for topics as listed above but is not proceeding with Immunization(s) Dtap/Tdap/Td and Shingles at this time.
detailed exam

## 2020-12-02 NOTE — DISCHARGE NOTE ADULT - ADDITIONAL INSTRUCTIONS
call tomorrow to schedule f/u appts with neurology and neurosurgery Cryotherapy Text: The wound bed was treated with cryotherapy after the biopsy was performed.

## 2021-02-04 ENCOUNTER — APPOINTMENT (OUTPATIENT)
Dept: NEUROLOGY | Facility: CLINIC | Age: 66
End: 2021-02-04
Payer: COMMERCIAL

## 2021-02-04 ENCOUNTER — OUTPATIENT (OUTPATIENT)
Dept: OUTPATIENT SERVICES | Facility: HOSPITAL | Age: 66
LOS: 1 days | End: 2021-02-04
Payer: COMMERCIAL

## 2021-02-04 ENCOUNTER — APPOINTMENT (OUTPATIENT)
Dept: MRI IMAGING | Facility: IMAGING CENTER | Age: 66
End: 2021-02-04
Payer: COMMERCIAL

## 2021-02-04 ENCOUNTER — RESULT REVIEW (OUTPATIENT)
Age: 66
End: 2021-02-04

## 2021-02-04 DIAGNOSIS — Z00.8 ENCOUNTER FOR OTHER GENERAL EXAMINATION: ICD-10-CM

## 2021-02-04 DIAGNOSIS — G93.9 DISORDER OF BRAIN, UNSPECIFIED: Chronic | ICD-10-CM

## 2021-02-04 DIAGNOSIS — C71.9 MALIGNANT NEOPLASM OF BRAIN, UNSPECIFIED: ICD-10-CM

## 2021-02-04 PROCEDURE — 99072 ADDL SUPL MATRL&STAF TM PHE: CPT

## 2021-02-04 PROCEDURE — 70553 MRI BRAIN STEM W/O & W/DYE: CPT

## 2021-02-04 PROCEDURE — 70553 MRI BRAIN STEM W/O & W/DYE: CPT | Mod: 26

## 2021-02-04 PROCEDURE — 99215 OFFICE O/P EST HI 40 MIN: CPT

## 2021-02-04 PROCEDURE — A9585: CPT

## 2021-02-04 NOTE — PHYSICAL EXAM
[FreeTextEntry1] : \par Patient seen and examined\par Alert, awake , Oriented X 3. Speech clear and fluent\par PERRLA, EOMI, VFF\par Face symmetric. Tongue protrudes midline\par HOBBS x 4 with good and equal strength with reduced left hand grasp\par Decreased FFM on the left - stable.\par Sensation intact bilaterally\par Gait steady. Ambulating independently. \par \par LE without evidence of swelling - warm with normal pulses.

## 2021-02-04 NOTE — DISCUSSION/SUMMARY
[FreeTextEntry1] : MRI from this am reviewed and compared to prior exams going back about 1 years - there is no new enhancement - there has been gradual flair change without any clinical change or increased perfusion (will check today's result when available).\par He remains seizure free on Keppra 500 mg bid.\par Plan unless Official MRI is different that my interpretation is for MRI and clinical follow up in 4 months.\par If there are any issues in the interim, he was encouraged to call me.

## 2021-02-04 NOTE — HISTORY OF PRESENT ILLNESS
[FreeTextEntry1] : Kendall Romano is a 61 yo male who presents today for a neuro oncology follow up. 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 Resection of the tumor when he p/w seizures followed by treatment with TMZ x 12 months\par Followed expectantly\par Feb, 2017- p/w focal seizures and LUE weakness. MRI revealed regrowth. \par Resection on 1/8/2018.\par Currently on Chemoradiation. RT started on 2/6/2018. Tolerating Temodar well . \par 4/26 - MRI showed small area of enhancement\par 6th cycle of TMZ from 9/16-9/20\par Completed 6 cycles of TMZ. \par 10/2018, 1/2019, 2/2019, 4/2019,7/2019, 9/2019, 12/2019, 3/2020 , 6/2020, 10/2020- MRI stable\par \par \par Denies headache, seizures, increased weakness, gait instability.

## 2021-02-18 ENCOUNTER — RX RENEWAL (OUTPATIENT)
Age: 66
End: 2021-02-18

## 2021-06-20 ENCOUNTER — OUTPATIENT (OUTPATIENT)
Dept: OUTPATIENT SERVICES | Facility: HOSPITAL | Age: 66
LOS: 1 days | End: 2021-06-20
Payer: COMMERCIAL

## 2021-06-20 ENCOUNTER — APPOINTMENT (OUTPATIENT)
Dept: MRI IMAGING | Facility: IMAGING CENTER | Age: 66
End: 2021-06-20
Payer: COMMERCIAL

## 2021-06-20 DIAGNOSIS — G93.9 DISORDER OF BRAIN, UNSPECIFIED: Chronic | ICD-10-CM

## 2021-06-20 DIAGNOSIS — Z00.8 ENCOUNTER FOR OTHER GENERAL EXAMINATION: ICD-10-CM

## 2021-06-20 DIAGNOSIS — C71.9 MALIGNANT NEOPLASM OF BRAIN, UNSPECIFIED: ICD-10-CM

## 2021-06-20 PROCEDURE — 70553 MRI BRAIN STEM W/O & W/DYE: CPT

## 2021-06-20 PROCEDURE — 70553 MRI BRAIN STEM W/O & W/DYE: CPT | Mod: 26

## 2021-06-20 PROCEDURE — A9585: CPT

## 2021-06-21 ENCOUNTER — APPOINTMENT (OUTPATIENT)
Dept: NEUROLOGY | Facility: CLINIC | Age: 66
End: 2021-06-21
Payer: COMMERCIAL

## 2021-06-21 PROCEDURE — 99215 OFFICE O/P EST HI 40 MIN: CPT

## 2021-06-21 PROCEDURE — 99072 ADDL SUPL MATRL&STAF TM PHE: CPT

## 2021-06-21 NOTE — DISCUSSION/SUMMARY
[FreeTextEntry1] : I have reviewed his MRI dated 6/20/2021 and have compared it personally to his prior scans from Feb and Oct, 2020. To my review, there is no new enhancement and no increased flair change. Official review is pending.\par Clinically, he is stable.\par Plan will be to return in 6 months for a repeat MRI scan - if he has any interval issues, he knows to call me.

## 2021-06-21 NOTE — HISTORY OF PRESENT ILLNESS
[FreeTextEntry1] : Kendall Romano is a 61 yo male who presents today for a neuro oncology follow up. 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 Resection of the tumor when he p/w seizures followed by treatment with TMZ x 12 months\par Followed expectantly\par Feb, 2017- p/w focal seizures and LUE weakness. MRI revealed regrowth. \par Resection on 1/8/2018.\par Currently on Chemoradiation. RT started on 2/6/2018. Tolerating Temodar well . \par 4/26 - MRI showed small area of enhancement\par 6th cycle of TMZ from 9/16-9/20\par Completed 6 cycles of TMZ. \par 10/2018, 1/2019, 2/2019, 4/2019,7/2019, 9/2019, 12/2019, 3/2020 , 6/2020, 10/2020,2/2021- MRI stable\par \par \par Denies headache, seizures, increased weakness, gait instability. \par  \par

## 2021-12-04 ENCOUNTER — OUTPATIENT (OUTPATIENT)
Dept: OUTPATIENT SERVICES | Facility: HOSPITAL | Age: 66
LOS: 1 days | End: 2021-12-04
Payer: COMMERCIAL

## 2021-12-04 ENCOUNTER — APPOINTMENT (OUTPATIENT)
Dept: MRI IMAGING | Facility: IMAGING CENTER | Age: 66
End: 2021-12-04
Payer: COMMERCIAL

## 2021-12-04 DIAGNOSIS — C71.9 MALIGNANT NEOPLASM OF BRAIN, UNSPECIFIED: ICD-10-CM

## 2021-12-04 DIAGNOSIS — R56.9 UNSPECIFIED CONVULSIONS: ICD-10-CM

## 2021-12-04 DIAGNOSIS — Z00.8 ENCOUNTER FOR OTHER GENERAL EXAMINATION: ICD-10-CM

## 2021-12-04 DIAGNOSIS — G93.9 DISORDER OF BRAIN, UNSPECIFIED: Chronic | ICD-10-CM

## 2021-12-04 PROCEDURE — 70553 MRI BRAIN STEM W/O & W/DYE: CPT

## 2021-12-04 PROCEDURE — A9585: CPT

## 2021-12-04 PROCEDURE — 70553 MRI BRAIN STEM W/O & W/DYE: CPT | Mod: 26

## 2021-12-07 ENCOUNTER — APPOINTMENT (OUTPATIENT)
Dept: NEUROLOGY | Facility: CLINIC | Age: 66
End: 2021-12-07
Payer: COMMERCIAL

## 2021-12-07 PROCEDURE — 99215 OFFICE O/P EST HI 40 MIN: CPT

## 2021-12-07 NOTE — DISCUSSION/SUMMARY
[FreeTextEntry1] : I have reviewed his most recent MRI brain from 12/4 and have personally compared it to prior exams including 6/20201 and 10/2020. I see no new enhancement and no change in flair abnormality.\par \par Plan is to return in 6 months with repeat neuroimaging at that time. Should he have any issues in the interim, he knows to call me.

## 2021-12-07 NOTE — PHYSICAL EXAM
[FreeTextEntry1] : He is awake and alert - oriented and fluent\par EOMI, VFF\par Face symmetric and tongue protrudes midline\par There is normal tone\par Left drift (sensory)\par Decreased FFM and  on the left - not new.\par LE strength full\par Ambulating independently and steadily.

## 2021-12-07 NOTE — HISTORY OF PRESENT ILLNESS
[FreeTextEntry1] : Kendall Romano is a 61 yo male who presents today for a neuro oncology follow up. 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 Resection of the tumor when he p/w seizures followed by treatment with TMZ x 12 months\par Followed expectantly\par Feb, 2017- p/w focal seizures and LUE weakness. MRI revealed regrowth. \par Resection on 1/8/2018.\par Currently on Chemoradiation. RT started on 2/6/2018. Tolerating Temodar well . \par 4/26 - MRI showed small area of enhancement\par 6th cycle of TMZ from 9/16-9/20\par Completed 6 cycles of TMZ. \par 10/2018, 1/2019, 2/2019, 4/2019,7/2019, 9/2019, 12/2019, 3/2020 , 6/2020, 10/2020,2/2021, 6/2021- MRI stable\par \par \par Denies headache, seizures, increased weakness, gait instability. \par  \par

## 2022-05-09 ENCOUNTER — APPOINTMENT (OUTPATIENT)
Dept: UROLOGY | Facility: CLINIC | Age: 67
End: 2022-05-09
Payer: COMMERCIAL

## 2022-05-09 VITALS
WEIGHT: 157 LBS | HEART RATE: 96 BPM | SYSTOLIC BLOOD PRESSURE: 128 MMHG | BODY MASS INDEX: 24.64 KG/M2 | HEIGHT: 67 IN | DIASTOLIC BLOOD PRESSURE: 75 MMHG

## 2022-05-09 PROCEDURE — 99203 OFFICE O/P NEW LOW 30 MIN: CPT

## 2022-05-09 RX ORDER — GLIMEPIRIDE 1 MG/1
1 TABLET ORAL
Qty: 90 | Refills: 0 | Status: DISCONTINUED | COMMUNITY
Start: 2022-04-02

## 2022-05-09 RX ORDER — LINAGLIPTIN AND METFORMIN HYDROCHLORIDE 5; 1000 MG/1; MG/1
5-1000 TABLET, FILM COATED, EXTENDED RELEASE ORAL
Qty: 90 | Refills: 0 | Status: DISCONTINUED | COMMUNITY
Start: 2022-04-02

## 2022-05-09 RX ORDER — METFORMIN HYDROCHLORIDE 500 MG/1
500 TABLET, COATED ORAL TWICE DAILY
Qty: 60 | Refills: 0 | Status: COMPLETED | COMMUNITY
Start: 2018-01-23 | End: 2022-05-09

## 2022-05-18 NOTE — PHYSICAL EXAM
[General Appearance - Well Developed] : well developed [General Appearance - Well Nourished] : well nourished [Normal Appearance] : normal appearance [Well Groomed] : well groomed [General Appearance - In No Acute Distress] : no acute distress [Edema] : no peripheral edema [Respiration, Rhythm And Depth] : normal respiratory rhythm and effort [Exaggerated Use Of Accessory Muscles For Inspiration] : no accessory muscle use [Abdomen Soft] : soft [Abdomen Tenderness] : non-tender [Costovertebral Angle Tenderness] : no ~M costovertebral angle tenderness [Urethral Meatus] : meatus normal [Urinary Bladder Findings] : the bladder was normal on palpation [Normal Station and Gait] : the gait and station were normal for the patient's age [] : no rash [No Focal Deficits] : no focal deficits [Oriented To Time, Place, And Person] : oriented to person, place, and time [Affect] : the affect was normal [Mood] : the mood was normal [Not Anxious] : not anxious [No Palpable Adenopathy] : no palpable adenopathy [FreeTextEntry1] : Large right inguinal hernia, non tender but non reducible.

## 2022-05-18 NOTE — LETTER BODY
[Dear  ___] : Dear  [unfilled], [FreeTextEntry1] : I had the pleasure of seeing your patient, DANIEL MADRID, in the office today.  \par \par Please see my office note below.\par \par Thank you for allowing me to participate in his care and please do not hesitate to contact me with any questions or concerns regarding his care.\par \par Sincerely,\par \par Zachery Partida MD\par Chief of Urology, [facility]\par  of Urology\par [address] [place] [address]\[place] [address]\par PH:      [phone]\par Email:  [email or web address]

## 2022-05-18 NOTE — HISTORY OF PRESENT ILLNESS
[FreeTextEntry1] : Patient is a 66 year old man comes in today for erectile dysfunction \par \par He reports that stream is good. Denies any urgency or frequency. Denies any hematuria, dysuria or incontinence. \par He feels that his right inguinal hernia is causing him difficulty getting the erections. \par Erections right now 1/10, has been happening for the last year. He has never tried any PDE5 inhibitors. When he was getting erections, denied any painful erections or curvature with erections. \par He reports he has had the hernia for several years \par \par He reports he had a PSA last month at Dr. Montoya's office \par \par \par \par

## 2022-05-18 NOTE — ASSESSMENT
[FreeTextEntry1] : Has large right inguinal hernia on exam.  Hernia is nonreducible and seems to be increased in size.\par I agree that this may be interfering with his rectal function given the size.  I given the name of several surgeons who specialize in hernia repair.  I recommend that he proceed with inguinal hernia repair.  We briefly discussed the approaches including both open and robotic/laparoscopic.\par \par Following repair of his inguinal hernia, if he continues to have issues with rectal dysfunction, then will consider medical therapy with either sildenafil or tadalafil.

## 2022-06-04 ENCOUNTER — APPOINTMENT (OUTPATIENT)
Dept: MRI IMAGING | Facility: IMAGING CENTER | Age: 67
End: 2022-06-04

## 2022-06-07 ENCOUNTER — APPOINTMENT (OUTPATIENT)
Dept: NEUROLOGY | Facility: CLINIC | Age: 67
End: 2022-06-07
Payer: COMMERCIAL

## 2022-06-07 ENCOUNTER — APPOINTMENT (OUTPATIENT)
Dept: MRI IMAGING | Facility: IMAGING CENTER | Age: 67
End: 2022-06-07

## 2022-06-07 PROCEDURE — 99215 OFFICE O/P EST HI 40 MIN: CPT

## 2022-06-07 RX ORDER — LINAGLIPTIN AND METFORMIN HYDROCHLORIDE 2.5; 1 MG/1; MG/1
TABLET, FILM COATED ORAL
Refills: 0 | Status: DISCONTINUED | COMMUNITY
End: 2022-06-07

## 2022-06-07 RX ORDER — LEVETIRACETAM 500 MG/1
500 TABLET, FILM COATED ORAL
Qty: 180 | Refills: 2 | Status: DISCONTINUED | COMMUNITY
Start: 2021-12-07 | End: 2022-06-07

## 2022-06-07 NOTE — HISTORY OF PRESENT ILLNESS
[FreeTextEntry1] : Kendall Romano is a 61 yo male who presents today for a neuro oncology follow up. 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 Resection of the tumor when he p/w seizures followed by treatment with TMZ x 12 months\par Followed expectantly\par Feb, 2017- p/w focal seizures and LUE weakness. MRI revealed regrowth. \par Resection on 1/8/2018.\par Currently on Chemoradiation. RT started on 2/6/2018. Tolerating Temodar well . \par 4/26 - MRI showed small area of enhancement\par 6th cycle of TMZ from 9/16-9/20\par Completed 6 cycles of TMZ. \par 10/2018, 1/2019, 2/2019, 4/2019,7/2019, 9/2019, 12/2019, 3/2020 , 6/2020, 10/2020,2/2021, 6/2021, 12/2021- MRI stable.\par He offers no new complaints.\par Remains seizure free on Keppra 500 mg bid and reports good compliance.

## 2022-06-07 NOTE — DISCUSSION/SUMMARY
[FreeTextEntry1] : Patient seen and examined - he is neurologically stable and has excellent seizure control.\par I have reviewed his most recent MRI - performed at outside institution dated 5/21/2022 and have compared it personally to prior exams dated 12/2021 and 2/2021. To my review,the right parietal operative cavity remains empty and surrounding flair abnormality is without change.\par Will repeat MRI along with a follow up in January, 2023. \par In the interim, if any concerns patient knows to call our office.

## 2022-06-07 NOTE — PHYSICAL EXAM
[] : no respiratory distress [Respiration, Rhythm And Depth] : normal respiratory rhythm and effort [Exaggerated Use Of Accessory Muscles For Inspiration] : no accessory muscle use [FreeTextEntry1] : He is awake and alert - oriented and fluent\par EOMI, VFF\par Face symmetric and tongue protrudes midline\par There is normal tone\par Left drift (sensory)\par Decreased FFM and  on the left - not new.\par LE strength full\par Ambulating independently and steadily. \par

## 2022-06-21 ENCOUNTER — APPOINTMENT (OUTPATIENT)
Dept: SURGERY | Facility: CLINIC | Age: 67
End: 2022-06-21
Payer: COMMERCIAL

## 2022-06-21 VITALS
DIASTOLIC BLOOD PRESSURE: 68 MMHG | TEMPERATURE: 97.8 F | OXYGEN SATURATION: 98 % | HEIGHT: 67 IN | WEIGHT: 148.5 LBS | HEART RATE: 85 BPM | SYSTOLIC BLOOD PRESSURE: 127 MMHG | BODY MASS INDEX: 23.31 KG/M2

## 2022-06-21 PROCEDURE — 99204 OFFICE O/P NEW MOD 45 MIN: CPT

## 2022-06-21 NOTE — ASSESSMENT
[FreeTextEntry1] : 67 yo male with large scrotal right inguinal hernia\par Plan: Open Right Inguinal Hernia\par All risk, benefit, alternatives were explained and the patient expressed full understanding.

## 2022-06-21 NOTE — PHYSICAL EXAM
[Alert] : alert [Oriented to Person] : oriented to person [Oriented to Place] : oriented to place [Oriented to Time] : oriented to time [Calm] : calm [de-identified] : well appearing [de-identified] : right scrotal inguinal hernia

## 2022-06-21 NOTE — HISTORY OF PRESENT ILLNESS
[de-identified] : 67 yo male with hx of DM who presents to the office for evaluation of large scrotal right inguinal hernia. He denied any obstructive symptoms.

## 2022-08-05 ENCOUNTER — OUTPATIENT (OUTPATIENT)
Dept: OUTPATIENT SERVICES | Facility: HOSPITAL | Age: 67
LOS: 1 days | Discharge: HOME | End: 2022-08-05

## 2022-08-05 VITALS
WEIGHT: 152.12 LBS | DIASTOLIC BLOOD PRESSURE: 71 MMHG | RESPIRATION RATE: 16 BRPM | OXYGEN SATURATION: 99 % | SYSTOLIC BLOOD PRESSURE: 124 MMHG | HEART RATE: 66 BPM

## 2022-08-05 DIAGNOSIS — E11.9 TYPE 2 DIABETES MELLITUS WITHOUT COMPLICATIONS: ICD-10-CM

## 2022-08-05 DIAGNOSIS — Z01.818 ENCOUNTER FOR OTHER PREPROCEDURAL EXAMINATION: ICD-10-CM

## 2022-08-05 DIAGNOSIS — Z01.812 ENCOUNTER FOR PREPROCEDURAL LABORATORY EXAMINATION: ICD-10-CM

## 2022-08-05 DIAGNOSIS — K40.90 UNILATERAL INGUINAL HERNIA, WITHOUT OBSTRUCTION OR GANGRENE, NOT SPECIFIED AS RECURRENT: ICD-10-CM

## 2022-08-05 DIAGNOSIS — Z87.898 PERSONAL HISTORY OF OTHER SPECIFIED CONDITIONS: ICD-10-CM

## 2022-08-05 DIAGNOSIS — Z98.890 OTHER SPECIFIED POSTPROCEDURAL STATES: Chronic | ICD-10-CM

## 2022-08-05 DIAGNOSIS — G93.9 DISORDER OF BRAIN, UNSPECIFIED: Chronic | ICD-10-CM

## 2022-08-05 LAB
ANION GAP SERPL CALC-SCNC: 9 MMOL/L — SIGNIFICANT CHANGE UP (ref 5–17)
BUN SERPL-MCNC: 18 MG/DL — SIGNIFICANT CHANGE UP (ref 7–23)
CALCIUM SERPL-MCNC: 9.4 MG/DL — SIGNIFICANT CHANGE UP (ref 8.5–10.1)
CHLORIDE SERPL-SCNC: 110 MMOL/L — HIGH (ref 96–108)
CO2 SERPL-SCNC: 26 MMOL/L — SIGNIFICANT CHANGE UP (ref 22–31)
CREAT SERPL-MCNC: 1.05 MG/DL — SIGNIFICANT CHANGE UP (ref 0.5–1.3)
EGFR: 79 ML/MIN/1.73M2 — SIGNIFICANT CHANGE UP
GLUCOSE SERPL-MCNC: 145 MG/DL — HIGH (ref 70–99)
HCT VFR BLD CALC: 47 % — SIGNIFICANT CHANGE UP (ref 39–50)
HGB BLD-MCNC: 15 G/DL — SIGNIFICANT CHANGE UP (ref 13–17)
MCHC RBC-ENTMCNC: 31.2 PG — SIGNIFICANT CHANGE UP (ref 27–34)
MCHC RBC-ENTMCNC: 31.9 G/DL — LOW (ref 32–36)
MCV RBC AUTO: 97.7 FL — SIGNIFICANT CHANGE UP (ref 80–100)
NRBC # BLD: 0 /100 WBCS — SIGNIFICANT CHANGE UP (ref 0–0)
PLATELET # BLD AUTO: 170 K/UL — SIGNIFICANT CHANGE UP (ref 150–400)
POTASSIUM SERPL-MCNC: 4.1 MMOL/L — SIGNIFICANT CHANGE UP (ref 3.5–5.3)
POTASSIUM SERPL-SCNC: 4.1 MMOL/L — SIGNIFICANT CHANGE UP (ref 3.5–5.3)
RBC # BLD: 4.81 M/UL — SIGNIFICANT CHANGE UP (ref 4.2–5.8)
RBC # FLD: 12.5 % — SIGNIFICANT CHANGE UP (ref 10.3–14.5)
SODIUM SERPL-SCNC: 145 MMOL/L — SIGNIFICANT CHANGE UP (ref 135–145)
WBC # BLD: 4.85 K/UL — SIGNIFICANT CHANGE UP (ref 3.8–10.5)
WBC # FLD AUTO: 4.85 K/UL — SIGNIFICANT CHANGE UP (ref 3.8–10.5)

## 2022-08-05 PROCEDURE — 93010 ELECTROCARDIOGRAM REPORT: CPT

## 2022-08-05 RX ORDER — METFORMIN HYDROCHLORIDE 850 MG/1
1 TABLET ORAL
Qty: 0 | Refills: 0 | DISCHARGE

## 2022-08-05 NOTE — H&P PST ADULT - NEUROLOGICAL COMMENTS
h/o brain tumor with surgery and subsequent chemotherapy; h/o "partial" seizures left side, as recent as 3 weeks ago h/o brain tumor with surgery and subsequent chemotherapy; h/o "partial" seizures left side, last episode 2017

## 2022-08-05 NOTE — H&P PST ADULT - PROBLEM SELECTOR PLAN 1
open right inguinal hernia repair with mesh  Pre-op instructions given by RN, patient verbalized understanding  Chlorhexidine wash instructions given   medical clearance  Anesthesiologist to review PST labs, EKG, required clearances and optimization for surgery.

## 2022-08-05 NOTE — H&P PST ADULT - ENDOCRINE COMMENTS
denies thyroid disease; h/o DM type II -- diagnosed approximately 2007 or 2008 -- recently taken off medication by his MD; doesn't do finger sticks denies thyroid disease; h/o DM type II NIDDM

## 2022-08-05 NOTE — H&P PST ADULT - HISTORY OF PRESENT ILLNESS
..............This is a 61 y/o male who presents with h/o brain surgery in 2009 with subsequent chemotherapy. Monitored with serial MRIs. Recent testing revealed right parietal mass. Scheduled for stereotactic right parietal awake craniotomy for brain tumor on 1-8-18 66M pmh DM, brain tumor  with h/o brain Sx/chemo in 2009, recurrence in 2017 presented with seizure treated with stereotactic right parietal awake craniotomy for brain tumor on 1-8-18. NO seizure since 2017  c/o right inguinal swelling and discomfort found to have unilateral inguinal hernia here for PST for scheduled open right inguinal hernia repair with mesh    This patient denies any fever, cough, sob, flu like symptoms or travel outside of the [place] in the past 30 days

## 2022-08-05 NOTE — H&P PST ADULT - NSICDXPASTMEDICALHX_GEN_ALL_CORE_FT
PAST MEDICAL HISTORY:  Brain tumor 2009, had surgery with post op chemotherapy    Diabetes mellitus type II diagnosed approx. 2007 or 2008 -- recently taken off medication Dec. 2017 to Jan. 2018 by his MD    Mass right parietal in Jan. 2018    Seizure manifested on left side -- "partial"    Snoring JENNIFER precautions -- responds affirmatively to STOP BANG questionnaire -- admits to loud snoring; age > 50; gender, male     PAST MEDICAL HISTORY:  Brain tumor 2009, had surgery with post op chemotherapy    Diabetes mellitus     Mass right parietal in Jan. 2018    Seizure manifested on left side -- "partial"    Snoring

## 2022-08-05 NOTE — H&P PST ADULT - OTHER CARE PROVIDERS
neurologist, Dr. Albert Torres  [phone] Milady Campbell (oncologist) [phone]........neurologist, Dr. Albert Torres  [phone]

## 2022-08-05 NOTE — H&P PST ADULT - NSICDXPASTSURGICALHX_GEN_ALL_CORE_FT
PAST SURGICAL HISTORY:  Brain mass 2009, excision of brain tumor -- received post op chemotherapy     PAST SURGICAL HISTORY:  Brain mass 2009, excision of brain tumor -- received post op chemotherapy    H/O brain surgery Jan 2018

## 2022-08-05 NOTE — H&P PST ADULT - ASSESSMENT
66M pmh DM, brain tumor  with h/o brain Sx/chemo in , recurrence in 2017 presented with seizure treated with stereotactic right parietal awake craniotomy for brain tumor on 18. NO seizure since 2017  CAPRINI SCORE [CLOT]    AGE RELATED RISK FACTORS                                                       MOBILITY RELATED FACTORS  [ ] Age 41-60 years                                            (1 Point)                  [ ] Bed rest                                                        (1 Point)  [x] Age: 61-74 years                                           (2 Points)                 [ ] Plaster cast                                                   (2 Points)  [ ] Age= 75 years                                              (3 Points)                 [ ] Bed bound for more than 72 hours                 (2 Points)    DISEASE RELATED RISK FACTORS                                               GENDER SPECIFIC FACTORS  [ ] Edema in the lower extremities                       (1 Point)                  [ ] Pregnancy                                                     (1 Point)  [ ] Varicose veins                                               (1 Point)                  [ ] Post-partum < 6 weeks                                   (1 Point)             x[ ] BMI > 25 Kg/m2                                            (1 Point)                  [ ] Hormonal therapy  or oral contraception          (1 Point)                 [ ] Sepsis (in the previous month)                        (1 Point)                  [ ] History of pregnancy complications                 (1 point)  [ ] Pneumonia or serious lung disease                                               [ ] Unexplained or recurrent                     (1 Point)           (in the previous month)                               (1 Point)  [ ] Abnormal pulmonary function test                     (1 Point)                 SURGERY RELATED RISK FACTORS  [ ] Acute myocardial infarction                              (1 Point)                 [ ]  Section                                             (1 Point)  [ ] Congestive heart failure (in the previous month)  (1 Point)               [ ] Minor surgery                                                  (1 Point)   [ ] Inflammatory bowel disease                             (1 Point)                 [ ] Arthroscopic surgery                                        (2 Points)  [ ] Central venous access                                      (2 Points)                [ x] General surgery lasting more than 45 minutes   (2 Points)       [ ] Stroke (in the previous month)                          (5 Points)               [ ] Elective arthroplasty                                         (5 Points)                                                                                                                                               HEMATOLOGY RELATED FACTORS                                                 TRAUMA RELATED RISK FACTORS  [ ] Prior episodes of VTE                                     (3 Points)                [ ] Fracture of the hip, pelvis, or leg                       (5 Points)  [ ] Positive family history for VTE                         (3 Points)                 [ ] Acute spinal cord injury (in the previous month)  (5 Points)  [ ] Prothrombin 22312 A                                     (3 Points)                 [ ] Paralysis  (less than 1 month)                             (5 Points)  [ ] Factor V Leiden                                             (3 Points)                  [ ] Multiple Trauma within 1 month                        (5 Points)  [ ] Lupus anticoagulants                                     (3 Points)                                                           [ ] Anticardiolipin antibodies                               (3 Points)                                                       [ ] High homocysteine in the blood                      (3 Points)                                             [ ] Other congenital or acquired thrombophilia      (3 Points)                                                [ ] Heparin induced thrombocytopenia                  (3 Points)                                          Total Score [   5       ]    Caprini Score 0 - 2:  Low Risk, No VTE Prophylaxis required for most patients, encourage ambulation  Caprini Score 3 - 6:  At Risk, pharmacologic VTE prophylaxis is indicated for most patients (in the absence of a contraindication)  Caprini Score Greater than or = 7:  High Risk, pharmacologic VTE prophylaxis is indicated for most patients (in the absence of a contraindication)

## 2022-08-05 NOTE — H&P PST ADULT - PROBLEM SELECTOR PLAN 3
s/p stereotactic right parietal awake craniotomy for brain tumor on 1-8-18  No seizure since brain sx on [place]  Keppra level ordered  followed by oncology

## 2022-08-05 NOTE — H&P PST ADULT - NSICDXFAMILYHX_GEN_ALL_CORE_FT
FAMILY HISTORY:  Father  Still living? No  Family history of diabetes mellitus in father, Age at diagnosis: Age Unknown    Mother  Still living? Yes, Estimated age: Age Unknown  Family history of diabetes mellitus in mother, Age at diagnosis: Age Unknown

## 2022-08-08 LAB — LEVETIRACETAM SERPL-MCNC: <1 UG/ML — LOW (ref 10–40)

## 2022-08-11 ENCOUNTER — NON-APPOINTMENT (OUTPATIENT)
Age: 67
End: 2022-08-11

## 2022-08-23 ENCOUNTER — OUTPATIENT (OUTPATIENT)
Dept: OUTPATIENT SERVICES | Facility: HOSPITAL | Age: 67
LOS: 1 days | Discharge: HOME | End: 2022-08-23

## 2022-08-23 DIAGNOSIS — Z98.890 OTHER SPECIFIED POSTPROCEDURAL STATES: Chronic | ICD-10-CM

## 2022-08-23 DIAGNOSIS — U07.1 COVID-19: ICD-10-CM

## 2022-08-23 DIAGNOSIS — G93.9 DISORDER OF BRAIN, UNSPECIFIED: Chronic | ICD-10-CM

## 2022-08-23 PROBLEM — E11.9 TYPE 2 DIABETES MELLITUS WITHOUT COMPLICATIONS: Chronic | Status: ACTIVE | Noted: 2018-01-04

## 2022-08-23 PROBLEM — R06.83 SNORING: Chronic | Status: ACTIVE | Noted: 2018-01-04

## 2022-08-23 LAB
FLUAV AG NPH QL: SIGNIFICANT CHANGE UP
FLUBV AG NPH QL: SIGNIFICANT CHANGE UP
SARS-COV-2 RNA SPEC QL NAA+PROBE: SIGNIFICANT CHANGE UP

## 2022-08-25 ENCOUNTER — TRANSCRIPTION ENCOUNTER (OUTPATIENT)
Age: 67
End: 2022-08-25

## 2022-08-26 ENCOUNTER — OUTPATIENT (OUTPATIENT)
Dept: OUTPATIENT SERVICES | Facility: HOSPITAL | Age: 67
LOS: 1 days | Discharge: HOME | End: 2022-08-26

## 2022-08-26 ENCOUNTER — TRANSCRIPTION ENCOUNTER (OUTPATIENT)
Age: 67
End: 2022-08-26

## 2022-08-26 ENCOUNTER — APPOINTMENT (OUTPATIENT)
Dept: SURGERY | Facility: HOSPITAL | Age: 67
End: 2022-08-26

## 2022-08-26 ENCOUNTER — APPOINTMENT (OUTPATIENT)
Dept: SURGERY | Facility: CLINIC | Age: 67
End: 2022-08-26

## 2022-08-26 VITALS
DIASTOLIC BLOOD PRESSURE: 78 MMHG | HEIGHT: 67 IN | WEIGHT: 152.12 LBS | SYSTOLIC BLOOD PRESSURE: 128 MMHG | OXYGEN SATURATION: 99 % | TEMPERATURE: 98 F | RESPIRATION RATE: 14 BRPM | HEART RATE: 78 BPM

## 2022-08-26 VITALS
OXYGEN SATURATION: 100 % | RESPIRATION RATE: 18 BRPM | DIASTOLIC BLOOD PRESSURE: 85 MMHG | HEART RATE: 78 BPM | SYSTOLIC BLOOD PRESSURE: 128 MMHG

## 2022-08-26 DIAGNOSIS — G93.9 DISORDER OF BRAIN, UNSPECIFIED: Chronic | ICD-10-CM

## 2022-08-26 DIAGNOSIS — Z98.890 OTHER SPECIFIED POSTPROCEDURAL STATES: Chronic | ICD-10-CM

## 2022-08-26 LAB
GLUCOSE BLDC GLUCOMTR-MCNC: 102 MG/DL — HIGH (ref 70–99)
GLUCOSE BLDC GLUCOMTR-MCNC: 132 MG/DL — HIGH (ref 70–99)

## 2022-08-26 PROCEDURE — 49505 PRP I/HERN INIT REDUC >5 YR: CPT | Mod: AS

## 2022-08-26 PROCEDURE — 49505 PRP I/HERN INIT REDUC >5 YR: CPT

## 2022-08-26 DEVICE — MESH HERNIA PERFIX PLUG MEDIUM 1.3 X 1.55": Type: IMPLANTABLE DEVICE | Status: FUNCTIONAL

## 2022-08-26 RX ORDER — ONDANSETRON 8 MG/1
4 TABLET, FILM COATED ORAL ONCE
Refills: 0 | Status: DISCONTINUED | OUTPATIENT
Start: 2022-08-26 | End: 2022-08-27

## 2022-08-26 RX ORDER — HYDROMORPHONE HYDROCHLORIDE 2 MG/ML
0.5 INJECTION INTRAMUSCULAR; INTRAVENOUS; SUBCUTANEOUS
Refills: 0 | Status: DISCONTINUED | OUTPATIENT
Start: 2022-08-26 | End: 2022-08-27

## 2022-08-26 RX ORDER — OXYCODONE HYDROCHLORIDE 5 MG/1
1 TABLET ORAL
Qty: 20 | Refills: 0
Start: 2022-08-26 | End: 2022-08-30

## 2022-08-26 RX ORDER — SODIUM CHLORIDE 9 MG/ML
1000 INJECTION, SOLUTION INTRAVENOUS
Refills: 0 | Status: DISCONTINUED | OUTPATIENT
Start: 2022-08-26 | End: 2022-08-27

## 2022-08-26 RX ORDER — LINAGLIPTIN AND METFORMIN HYDROCHLORIDE 2.5; 85 MG/1; MG/1
1 TABLET, FILM COATED ORAL
Qty: 0 | Refills: 0 | DISCHARGE

## 2022-08-26 RX ORDER — OXYCODONE HYDROCHLORIDE 5 MG/1
5 TABLET ORAL EVERY 6 HOURS
Refills: 0 | Status: DISCONTINUED | OUTPATIENT
Start: 2022-08-26 | End: 2022-08-26

## 2022-08-26 RX ORDER — OXYCODONE HYDROCHLORIDE 5 MG/1
10 TABLET ORAL EVERY 6 HOURS
Refills: 0 | Status: DISCONTINUED | OUTPATIENT
Start: 2022-08-26 | End: 2022-08-26

## 2022-08-26 RX ORDER — GLIMEPIRIDE 1 MG
1 TABLET ORAL
Qty: 0 | Refills: 0 | DISCHARGE

## 2022-08-26 RX ORDER — ATORVASTATIN CALCIUM 80 MG/1
0 TABLET, FILM COATED ORAL
Qty: 0 | Refills: 0 | DISCHARGE

## 2022-08-26 RX ORDER — ACETAMINOPHEN 500 MG
2 TABLET ORAL
Qty: 0 | Refills: 0 | DISCHARGE

## 2022-08-26 RX ORDER — LEVETIRACETAM 250 MG/1
0 TABLET, FILM COATED ORAL
Qty: 0 | Refills: 0 | DISCHARGE

## 2022-08-26 RX ORDER — IBUPROFEN 200 MG
1 TABLET ORAL
Qty: 0 | Refills: 0 | DISCHARGE

## 2022-08-26 RX ADMIN — HYDROMORPHONE HYDROCHLORIDE 0.5 MILLIGRAM(S): 2 INJECTION INTRAMUSCULAR; INTRAVENOUS; SUBCUTANEOUS at 15:48

## 2022-08-26 RX ADMIN — SODIUM CHLORIDE 50 MILLILITER(S): 9 INJECTION, SOLUTION INTRAVENOUS at 15:48

## 2022-08-26 NOTE — ASU PATIENT PROFILE, ADULT - NSICDXPASTSURGICALHX_GEN_ALL_CORE_FT
PAST SURGICAL HISTORY:  Brain mass 2009, excision of brain tumor -- received post op chemotherapy    H/O brain surgery Jan 2018

## 2022-08-26 NOTE — ASU DISCHARGE PLAN (ADULT/PEDIATRIC) - MEDICATION INSTRUCTIONS
May take Tylenol and Advil for pain as needed. May take prescribed narcotic pain medicine for severe pain as needed. DO NOT DRIVE OR MAKE IMPORTANT DECISIONS WHEN TAKING NARCOTIC PAIN MEDICATION.

## 2022-08-26 NOTE — BRIEF OPERATIVE NOTE - NSICDXBRIEFPROCEDURE_GEN_ALL_CORE_FT
PROCEDURES:  Open repair of inguinal hernia using mesh in adult 26-Aug-2022 15:49:57 right Reyes, Madeleine

## 2022-08-26 NOTE — ASU PREOP CHECKLIST - ALLERGIES REVIEWED
Patient has a nonfunctional penile prosthesis  Possible infection of the penile prosthesis  Evaluated by Urology on the previous admission 08/23/2019, it was recommended for it to be removed, patient refused, wanted to do it as outpatient  CT abd 9/14/19 "Small amount of fluid within the left perirectal/mesorectal space, likely residual to prior infection, without significant evidence of a deep pelvic and perineal soft tissue infection as seen on prior exam of 8/23/2019  Residual focus of air seen along   the right inferior pubic rami, which may be ulcer related"  Urology consult placed  Evaluated by Infectious Disease  Continue current antibiotics  done

## 2022-08-26 NOTE — ASU DISCHARGE PLAN (ADULT/PEDIATRIC) - ASU DC SPECIAL INSTRUCTIONSFT
May remove dressings and shower after 48 hours. Do not remove white steri strips as they will fall off on their own.     May take Tylenol and Advil for pain as needed. May take prescribed narcotic pain medicine for severe pain as needed. DO NOT DRIVE OR MAKE IMPORTANT DECISIONS WHEN TAKING NARCOTIC PAIN MEDICATION.    Do not lift more than 15 lbs until cleared to do so by your surgeon.    Please call your surgeon if any questions or concerns are to arise.

## 2022-08-26 NOTE — ASU DISCHARGE PLAN (ADULT/PEDIATRIC) - PROVIDER TOKENS
PROVIDER:[TOKEN:[[id number]],FOLLOWUP:[2 weeks]] FREE:[LAST:[Nalini],FIRST:[Rhianna],PHONE:[phone]],FAX:[phone]],ADDRESS:[Surgery  [address] [place] [address], [address] [place] [address]],FOLLOWUP:[2 weeks]]

## 2022-08-26 NOTE — ASU PATIENT PROFILE, ADULT - NSICDXPASTMEDICALHX_GEN_ALL_CORE_FT
PAST MEDICAL HISTORY:  Brain tumor 2009, had surgery with post op chemotherapy    Diabetes mellitus     Mass right parietal in Jan. 2018    Seizure manifested on left side -- "partial"    Snoring

## 2022-08-26 NOTE — ASU DISCHARGE PLAN (ADULT/PEDIATRIC) - NS MD DC FALL RISK RISK
For information on Fall & Injury Prevention, visit: [email or web address] OR  [email or web address] OR  https://www.cdc.gov/steadi/patient.html

## 2022-08-26 NOTE — ASU DISCHARGE PLAN (ADULT/PEDIATRIC) - CARE PROVIDER_API CALL
Rhianna Gayle)  Surgery  [address] [place] [address]  Phone: [phone]  Fax: [phone]  Follow Up Time: 2 weeks   Rhianna Gayle  Surgery  [address] [facility], [address]nd [address]  [place] [id number]  Phone: [phone]  Fax: [phone]  Follow Up Time: 2 weeks

## 2022-08-29 DIAGNOSIS — K40.90 UNILATERAL INGUINAL HERNIA, WITHOUT OBSTRUCTION OR GANGRENE, NOT SPECIFIED AS RECURRENT: ICD-10-CM

## 2022-09-22 ENCOUNTER — APPOINTMENT (OUTPATIENT)
Dept: SURGERY | Facility: CLINIC | Age: 67
End: 2022-09-22

## 2022-09-22 VITALS
HEART RATE: 81 BPM | TEMPERATURE: 97.4 F | SYSTOLIC BLOOD PRESSURE: 130 MMHG | OXYGEN SATURATION: 98 % | WEIGHT: 148 LBS | HEIGHT: 67 IN | BODY MASS INDEX: 23.23 KG/M2 | DIASTOLIC BLOOD PRESSURE: 80 MMHG

## 2022-09-22 DIAGNOSIS — K40.90 UNILATERAL INGUINAL HERNIA, W/OUT OBSTRUCTION OR GANGRENE, NOT SPECIFIED AS RECURRENT: ICD-10-CM

## 2022-09-22 PROCEDURE — 99024 POSTOP FOLLOW-UP VISIT: CPT

## 2022-09-22 NOTE — HISTORY OF PRESENT ILLNESS
[de-identified] : Patient was seen and examined.  He is status post right scrotal inguinal hernia repair with mesh.  On exam wound edges are healing well.  There is seem to be some swelling on the cord which is expected after hernia repair.  He was reassured and was advised refrain from heavy lifting.  Return to office as needed.

## 2023-01-11 ENCOUNTER — NON-APPOINTMENT (OUTPATIENT)
Age: 68
End: 2023-01-11

## 2023-01-11 ENCOUNTER — APPOINTMENT (OUTPATIENT)
Dept: NEUROLOGY | Facility: CLINIC | Age: 68
End: 2023-01-11
Payer: COMMERCIAL

## 2023-01-11 PROCEDURE — 99215 OFFICE O/P EST HI 40 MIN: CPT

## 2023-01-11 NOTE — HISTORY OF PRESENT ILLNESS
[FreeTextEntry1] : Kendall Romano is a 63 yo male who presents today for a neuro oncology follow up. 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 Resection of the tumor when he p/w seizures followed by treatment with TMZ x 12 months\par Followed expectantly\par Feb, 2017- p/w focal seizures and LUE weakness. MRI revealed regrowth. \par Resection on 1/8/2018.\par Currently on Chemoradiation. RT started on 2/6/2018. Tolerating Temodar well . \par 4/26 - MRI showed small area of enhancement\par 6th cycle of TMZ from 9/16-9/20\par Completed 6 cycles of TMZ. \par 10/2018, 1/2019, 2/2019, 4/2019,7/2019, 9/2019, 12/2019, 3/2020 , 6/2020, 10/2020,2/2021, 6/2021, 12/2021, 6/2022- MRI stable.\par 1/11/2023- Here for a follow up along with a new MRI. Since last visit he weaned himself off of Keppra and remains seizure free. He also had an hernia repair in August.\par \par \par

## 2023-01-11 NOTE — REASON FOR VISIT
I have seen and evaluated this patient with the resident.   I agree with the findings  unless other wise stated.  After my face to face bedside evaluation, I am not[age] year old female with abdominal pain. PE: att exam: patient awake alert NAD . LUNGS CTAB no wheeze no crackle. CARD RRR no m/r/g.  Abdomen soft ruq ttp,  no rebound no guarding no CVA tenderness. EXT WWP no edema no calf tenderness CV 2+DP/PT bilaterally. neuro A&Ox3 gait normal.  skin warm and dry no rash [Follow-Up: _____] : a [unfilled] follow-up visit

## 2023-01-11 NOTE — PHYSICAL EXAM
[General Appearance - Alert] : alert [General Appearance - In No Acute Distress] : in no acute distress [General Appearance - Well Nourished] : well nourished [] : no respiratory distress [Respiration, Rhythm And Depth] : normal respiratory rhythm and effort [Exaggerated Use Of Accessory Muscles For Inspiration] : no accessory muscle use [Edema] : there was no peripheral edema [FreeTextEntry1] : Patient seen and examined\par Alert, awake , Oriented X 3. Speech clear and fluent\par PERRLA, EOMI, VFF\par Face symmetrical. Tongue protrudes midline\par HOBBS x 4 with good and equal strength\par FFM, coordination equal bilaterally\par Sensation intact bilaterally\par Gait steady. Ambulating independently\par

## 2023-01-11 NOTE — DISCUSSION/SUMMARY
[FreeTextEntry1] : Patient seen and examined - \par He is neurologically stable a\par MRI reviewed from 12/2022 and I compared to  5/21/2022 To my review,the right parietal operative cavity remains empty and surrounding flair abnormality is without change.\par Will repeat MRI along with a follow up in  July , 11, 2023. \par In the interim, if any concerns patient knows to call our office. \par \par  \par

## 2023-07-13 ENCOUNTER — APPOINTMENT (OUTPATIENT)
Dept: NEUROLOGY | Facility: CLINIC | Age: 68
End: 2023-07-13
Payer: COMMERCIAL

## 2023-07-13 VITALS
SYSTOLIC BLOOD PRESSURE: 120 MMHG | DIASTOLIC BLOOD PRESSURE: 80 MMHG | BODY MASS INDEX: 23.23 KG/M2 | HEIGHT: 67 IN | HEART RATE: 82 BPM | RESPIRATION RATE: 16 BRPM | OXYGEN SATURATION: 98 % | WEIGHT: 148 LBS

## 2023-07-13 PROCEDURE — 99215 OFFICE O/P EST HI 40 MIN: CPT

## 2023-07-13 RX ORDER — METFORMIN HYDROCHLORIDE 1000 MG/1
1000 TABLET, COATED ORAL DAILY
Qty: 30 | Refills: 0 | Status: ACTIVE | COMMUNITY
Start: 2023-07-13

## 2023-07-13 RX ORDER — LEVETIRACETAM 500 MG/1
500 TABLET, FILM COATED ORAL
Qty: 60 | Refills: 0 | Status: DISCONTINUED | COMMUNITY
Start: 2018-01-23 | End: 2023-07-13

## 2023-07-13 RX ORDER — LEVETIRACETAM 500 MG/1
500 TABLET, FILM COATED ORAL
Qty: 180 | Refills: 1 | Status: DISCONTINUED | COMMUNITY
Start: 2022-06-07 | End: 2023-07-13

## 2023-07-13 RX ORDER — GLIMEPIRIDE 1 MG/1
1 TABLET ORAL DAILY
Qty: 30 | Refills: 0 | Status: ACTIVE | COMMUNITY
Start: 2023-07-13

## 2023-07-14 NOTE — HISTORY OF PRESENT ILLNESS
[FreeTextEntry1] : Kendall Romano is a 61 yo male who presents today for a neuro oncology follow up. 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 Resection of the tumor when he p/w seizures followed by treatment with TMZ x 12 months\par Followed expectantly\par Feb, 2017- p/w focal seizures and LUE weakness. MRI revealed regrowth. \par Resection on 1/8/2018.\par Currently on Chemoradiation. RT started on 2/6/2018. Tolerating Temodar well . \par 4/26 - MRI showed small area of enhancement\par 6th cycle of TMZ from 9/16-9/20\par Completed 6 cycles of TMZ. \par 10/2018, 1/2019, 2/2019, 4/2019,7/2019, 9/2019, 12/2019, 3/2020 , 6/2020, 10/2020,2/2021, 6/2021, 12/2021, 6/2022- MRI stable.\par 1/11/2023- MRI 12/2022 stable.  Since last visit he weaned himself off of Keppra and remains seizure free. \par 7/13/2023- Here for a follow up . Offers no new complaints

## 2023-07-14 NOTE — DISCUSSION/SUMMARY
[FreeTextEntry1] : Patient seen and examined \par OLIGODENDROGLIOMA - He is neurologically stable \par MRI reviewed from 6/24/2023 and I compared it with imaging from  12/2022 To my review,the right parietal operative cavity remains empty and surrounding flair abnormality is without change.\par SEIZURES - No reported seizures. Continue monitoring off medications\par FOLLOW UP - Will repeat MRI (to include perfusion - ordered but not performed) along with a follow up in 6 months 1/9/2024\par In the interim, if any concerns patient knows to call our office.

## 2023-07-14 NOTE — PHYSICAL EXAM
[General Appearance - Alert] : alert [General Appearance - In No Acute Distress] : in no acute distress [Oriented To Time, Place, And Person] : oriented to person, place, and time [] : no respiratory distress [Respiration, Rhythm And Depth] : normal respiratory rhythm and effort [Exaggerated Use Of Accessory Muscles For Inspiration] : no accessory muscle use [Abnormal Walk] : normal gait [Edema] : there was no peripheral edema [FreeTextEntry1] : He is awake and alert - oriented and fluent\par EOMI, VFF\par Face symmetric and tongue protrudes midline\par There is normal tone\par Left drift (sensory)\par Decreased FFM and  on the left - not new.\par LE strength full\par Ambulating independently and steadily.

## 2024-01-17 ENCOUNTER — APPOINTMENT (OUTPATIENT)
Dept: NEUROLOGY | Facility: CLINIC | Age: 69
End: 2024-01-17
Payer: COMMERCIAL

## 2024-01-17 VITALS
HEIGHT: 67 IN | RESPIRATION RATE: 16 BRPM | WEIGHT: 150 LBS | BODY MASS INDEX: 23.54 KG/M2 | HEART RATE: 84 BPM | TEMPERATURE: 98 F | OXYGEN SATURATION: 98 % | SYSTOLIC BLOOD PRESSURE: 124 MMHG | DIASTOLIC BLOOD PRESSURE: 72 MMHG

## 2024-01-17 PROCEDURE — 99215 OFFICE O/P EST HI 40 MIN: CPT

## 2024-01-17 NOTE — HISTORY OF PRESENT ILLNESS
[FreeTextEntry1] : Kendall Romano is a 69 yo male who presents today for a neuro oncology follow up.  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 Resection of the tumor when he p/w seizures followed by treatment with TMZ x 12 months  Followed expectantly  Feb, 2017- p/w focal seizures and LUE weakness. MRI revealed regrowth.  Resection on 1/8/2018.  Currently on Chemoradiation. RT started on 2/6/2018. Tolerating Temodar well .  4/26 - MRI showed small area of enhancement  6th cycle of TMZ from 9/16-9/20  Completed 6 cycles of TMZ.  10/2018, 1/2019, 2/2019, 4/2019,7/2019, 9/2019, 12/2019, 3/2020 , 6/2020, 10/2020,2/2021, 6/2021, 12/2021, 6/2022- MRI stable.  1/11/2023- MRI 12/2022 stable. Since last visit he weaned himself off of Keppra and remains seizure free.  7/13/2023- MRI stable 1/17/2024- Here for a follow up along with a new MRI. Offers no new complaints Denies headaches, seizures

## 2024-01-17 NOTE — PHYSICAL EXAM
[General Appearance - Alert] : alert [General Appearance - In No Acute Distress] : in no acute distress [] : no respiratory distress [Respiration, Rhythm And Depth] : normal respiratory rhythm and effort [Exaggerated Use Of Accessory Muscles For Inspiration] : no accessory muscle use [Edema] : there was no peripheral edema [Abnormal Walk] : normal gait [FreeTextEntry1] : Awake and alert - oriented and fluent  EOMI, VFF  Face symmetric and tongue protrudes midline  There is normal tone  Left drift (sensory)  Decreased FFM and  on the left - not new.  LE strength full  Ambulating independently and steadily.

## 2024-01-17 NOTE — DISCUSSION/SUMMARY
[FreeTextEntry1] : Patient seen and examined  OLIGODENDROGLIOMA - He is neurologically stable  MRI reviewed from 1/6/2024  and I compared it with imaging from 6/24/2023 and  12/2022 To my review,the right parietal operative cavity remains empty and surrounding flair abnormality is without change.  SEIZURES - No reported seizures. Continue monitoring off medications  FOLLOW UP - Will repeat MRI (to include perfusion - ordered but not performed) along with a follow up in 6 months (7/9/2024)  In the interim, if any concerns patient knows to call our office.

## 2024-07-11 ENCOUNTER — APPOINTMENT (OUTPATIENT)
Dept: NEUROLOGY | Facility: CLINIC | Age: 69
End: 2024-07-11

## 2024-07-29 ENCOUNTER — NON-APPOINTMENT (OUTPATIENT)
Age: 69
End: 2024-07-29

## 2024-07-30 ENCOUNTER — APPOINTMENT (OUTPATIENT)
Dept: NEUROLOGY | Facility: CLINIC | Age: 69
End: 2024-07-30
Payer: COMMERCIAL

## 2024-07-30 VITALS
DIASTOLIC BLOOD PRESSURE: 76 MMHG | HEART RATE: 80 BPM | BODY MASS INDEX: 23.54 KG/M2 | OXYGEN SATURATION: 98 % | SYSTOLIC BLOOD PRESSURE: 128 MMHG | WEIGHT: 150 LBS | RESPIRATION RATE: 16 BRPM | TEMPERATURE: 98.2 F | HEIGHT: 67 IN

## 2024-07-30 PROCEDURE — 99215 OFFICE O/P EST HI 40 MIN: CPT

## 2024-07-30 NOTE — HISTORY OF PRESENT ILLNESS
[FreeTextEntry1] : Kendall Romano is a 69 yo male who presents today for a neuro oncology follow up.  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 Resection of the tumor when he p/w seizures followed by treatment with TMZ x 12 months  Followed expectantly  Feb, 2017- p/w focal seizures and LUE weakness. MRI revealed regrowth.  Resection on 1/8/2018.  Currently on Chemoradiation. RT started on 2/6/2018. Tolerating Temodar well .  4/26 - MRI showed small area of enhancement  6th cycle of TMZ from 9/16-9/20  Completed 6 cycles of TMZ.  10/2018, 1/2019, 2/2019, 4/2019,7/2019, 9/2019, 12/2019, 3/2020 , 6/2020, 10/2020,2/2021, 6/2021, 12/2021, 6/2022- MRI stable.  1/11/2023- MRI 12/2022 stable. Since last visit he weaned himself off of Keppra and remains seizure free.  7/13/2023, 1/6/2024 - MRI stable 7/6/2024 - MRI showed status post right parietal craniotomy with stable appearing post surgical changes in the right parietal lobe. No evidence of residual or recurrent tumor is identified. Small foci of susceptibility in the left parietal and right parietal white matter presumably representing chronic microhemorrhage, as described. Mild cortical volume loss.   7/30/2024 - - Here for a follow up along with a new MRI. Offers no new complaints Denies headaches, seizures

## 2024-07-30 NOTE — PHYSICAL EXAM
[General Appearance - Alert] : alert [General Appearance - In No Acute Distress] : in no acute distress [] : no respiratory distress [Respiration, Rhythm And Depth] : normal respiratory rhythm and effort [Exaggerated Use Of Accessory Muscles For Inspiration] : no accessory muscle use [Edema] : there was no peripheral edema [Abnormal Walk] : normal gait [FreeTextEntry1] : Patient seen and examined Alert, awake , Oriented X 3. Speech clear and fluent PERRLA, EOMI, VFF Face symmetrical. Tongue protrudes midline HOBBS x 4 with good and equal strength Decreased FFM and  to left( pre existing) Sensation intact bilaterally Gait steady. Ambulating independently

## 2024-07-30 NOTE — DISCUSSION/SUMMARY
[FreeTextEntry1] : Patient seen and examined  OLIGODENDROGLIOMA - He is neurologically stable  MRI reviewed from 7/6/2024  and I compared it with imaging from 2021 To my review,the right parietal operative cavity remains empty and surrounding flair abnormality is without change. Official impression read as follows : status post right parietal craniotomy with stable appearing post surgical changes in the right parietal lobe. No evidence of residual or recurrent tumor is identified. Small foci of susceptibility in the left parietal and right parietal white matter presumably representing chronic microhemorrhage, as described. Mild cortical volume loss.   SEIZURES - No reported seizures. Continue monitoring off medications  FOLLOW UP - Will repeat MRI (to include perfusion - ordered but not performed) along with a follow up in 6 months (1/8/2025)  In the interim, if any concerns patient knows to call our office.

## 2024-10-11 NOTE — DISCHARGE NOTE ADULT - NS MD DC PLAN IMMU FLU PROVIDE INFO
BMI:   HbA1c: A1C with Estimated Average Glucose Result: 5.4 % (09-29-24 @ 08:44)    Glucose:   BP: --Vital Signs Last 24 Hrs  T(C): 36.4 (10-11-24 @ 06:31), Max: 36.4 (10-11-24 @ 06:31)  T(F): 97.5 (10-11-24 @ 06:31), Max: 97.5 (10-11-24 @ 06:31)  HR: --  BP: --  BP(mean): --  RR: --  SpO2: --    Orthostatic VS  10-11-24 @ 06:31  Lying BP: --/-- HR: --  Sitting BP: 112/71 HR: 99  Standing BP: 114/72 HR: 109  Site: --  Mode: --  Orthostatic VS  10-10-24 @ 08:17  Lying BP: --/-- HR: --  Sitting BP: 124/87 HR: 86  Standing BP: 136/88 HR: 94  Site: --  Mode: --    Lipid Panel: Date/Time: 09-29-24 @ 08:44  Cholesterol, Serum: 220  LDL Cholesterol Calculated: 135  HDL Cholesterol, Serum: 36  Total Cholesterol/HDL Ration Measurement: --  Triglycerides, Serum: 247  
Risks/benefits discussed with patient or patient surrogate

## 2025-01-21 ENCOUNTER — APPOINTMENT (OUTPATIENT)
Dept: NEUROLOGY | Facility: CLINIC | Age: 70
End: 2025-01-21
Payer: COMMERCIAL

## 2025-01-21 ENCOUNTER — NON-APPOINTMENT (OUTPATIENT)
Age: 70
End: 2025-01-21

## 2025-01-21 VITALS
DIASTOLIC BLOOD PRESSURE: 79 MMHG | BODY MASS INDEX: 23.23 KG/M2 | HEART RATE: 81 BPM | HEIGHT: 67 IN | RESPIRATION RATE: 16 BRPM | TEMPERATURE: 97.8 F | OXYGEN SATURATION: 99 % | WEIGHT: 148 LBS | SYSTOLIC BLOOD PRESSURE: 134 MMHG

## 2025-01-21 PROCEDURE — 99215 OFFICE O/P EST HI 40 MIN: CPT

## 2025-07-16 ENCOUNTER — APPOINTMENT (OUTPATIENT)
Dept: NEUROLOGY | Facility: CLINIC | Age: 70
End: 2025-07-16

## 2025-07-23 ENCOUNTER — APPOINTMENT (OUTPATIENT)
Dept: NEUROLOGY | Facility: CLINIC | Age: 70
End: 2025-07-23

## 2025-07-23 ENCOUNTER — APPOINTMENT (OUTPATIENT)
Dept: NEUROLOGY | Facility: CLINIC | Age: 70
End: 2025-07-23
Payer: COMMERCIAL

## 2025-07-23 VITALS
HEART RATE: 88 BPM | OXYGEN SATURATION: 97 % | TEMPERATURE: 98.1 F | RESPIRATION RATE: 16 BRPM | WEIGHT: 149 LBS | HEIGHT: 67 IN | BODY MASS INDEX: 23.39 KG/M2 | DIASTOLIC BLOOD PRESSURE: 77 MMHG | SYSTOLIC BLOOD PRESSURE: 147 MMHG

## 2025-07-23 PROCEDURE — 99215 OFFICE O/P EST HI 40 MIN: CPT

## (undated) DEVICE — SUT PDS II 0 27" CT-1

## (undated) DEVICE — SOL IRR POUR H2O 500ML

## (undated) DEVICE — PACK MINOR WITH LAP

## (undated) DEVICE — DRAPE LAPAROTOMY TRANSVERSE

## (undated) DEVICE — SUT PDS II 2-0 27" SH

## (undated) DEVICE — FRA-ESU BOVIE FORCE TRIAD T7J19745DX: Type: DURABLE MEDICAL EQUIPMENT

## (undated) DEVICE — DRSG STERISTRIPS 0.5 X 4"

## (undated) DEVICE — POSITIONER STRAP ARMBOARD VELCRO TS-30

## (undated) DEVICE — ELCTR GROUNDING PAD ADULT COVIDIEN

## (undated) DEVICE — VENODYNE/SCD SLEEVE CALF MEDIUM

## (undated) DEVICE — DRSG TEGADERM 4X4.75"

## (undated) DEVICE — SUT VICRYL 4-0 18" PS-2 UNDYED

## (undated) DEVICE — SUT VICRYL 3-0 27" SH UNDYED